# Patient Record
Sex: MALE | Race: WHITE | Employment: FULL TIME | URBAN - METROPOLITAN AREA
[De-identification: names, ages, dates, MRNs, and addresses within clinical notes are randomized per-mention and may not be internally consistent; named-entity substitution may affect disease eponyms.]

---

## 2017-01-24 ENCOUNTER — HOSPITAL ENCOUNTER (OUTPATIENT)
Dept: LAB | Age: 47
Discharge: HOME | End: 2017-01-24
Payer: OTHER GOVERNMENT

## 2017-01-24 DIAGNOSIS — E78.00 PURE HYPERCHOLESTEROLEMIA: ICD-10-CM

## 2017-01-24 LAB
ALBUMIN SERPL BCP-MCNC: 3.8 G/DL (ref 3.4–5)
ALBUMIN/GLOB SERPL: 1.3 {RATIO} (ref 0.8–1.7)
ALP SERPL-CCNC: 49 U/L (ref 45–117)
ALT SERPL-CCNC: 40 U/L (ref 16–61)
ANION GAP BLD CALC-SCNC: 6 MMOL/L (ref 3–18)
APPEARANCE UR: CLEAR
AST SERPL W P-5'-P-CCNC: 15 U/L (ref 15–37)
BASOPHILS # BLD AUTO: 0 K/UL (ref 0–0.06)
BASOPHILS # BLD: 0 % (ref 0–2)
BILIRUB SERPL-MCNC: 0.5 MG/DL (ref 0.2–1)
BILIRUB UR QL: NEGATIVE
BUN SERPL-MCNC: 12 MG/DL (ref 7–18)
BUN/CREAT SERPL: 10 (ref 12–20)
CALCIUM SERPL-MCNC: 8.7 MG/DL (ref 8.5–10.1)
CHLORIDE SERPL-SCNC: 108 MMOL/L (ref 100–108)
CHOLEST SERPL-MCNC: 166 MG/DL
CO2 SERPL-SCNC: 28 MMOL/L (ref 21–32)
COLOR UR: YELLOW
CREAT SERPL-MCNC: 1.26 MG/DL (ref 0.6–1.3)
DIFFERENTIAL METHOD BLD: ABNORMAL
EOSINOPHIL # BLD: 0.1 K/UL (ref 0–0.4)
EOSINOPHIL NFR BLD: 2 % (ref 0–5)
ERYTHROCYTE [DISTWIDTH] IN BLOOD BY AUTOMATED COUNT: 13.3 % (ref 11.6–14.5)
GLOBULIN SER CALC-MCNC: 2.9 G/DL (ref 2–4)
GLUCOSE SERPL-MCNC: 102 MG/DL (ref 74–99)
GLUCOSE UR STRIP.AUTO-MCNC: NEGATIVE MG/DL
HCT VFR BLD AUTO: 47.7 % (ref 36–48)
HDLC SERPL-MCNC: 42 MG/DL (ref 40–60)
HDLC SERPL: 4 {RATIO} (ref 0–5)
HGB BLD-MCNC: 16.1 G/DL (ref 13–16)
HGB UR QL STRIP: NEGATIVE
KETONES UR QL STRIP.AUTO: NEGATIVE MG/DL
LDLC SERPL CALC-MCNC: 99.6 MG/DL (ref 0–100)
LEUKOCYTE ESTERASE UR QL STRIP.AUTO: NEGATIVE
LIPID PROFILE,FLP: NORMAL
LYMPHOCYTES # BLD AUTO: 37 % (ref 21–52)
LYMPHOCYTES # BLD: 1.9 K/UL (ref 0.9–3.6)
MCH RBC QN AUTO: 29.4 PG (ref 24–34)
MCHC RBC AUTO-ENTMCNC: 33.8 G/DL (ref 31–37)
MCV RBC AUTO: 87 FL (ref 74–97)
MONOCYTES # BLD: 0.3 K/UL (ref 0.05–1.2)
MONOCYTES NFR BLD AUTO: 6 % (ref 3–10)
NEUTS SEG # BLD: 2.8 K/UL (ref 1.8–8)
NEUTS SEG NFR BLD AUTO: 55 % (ref 40–73)
NITRITE UR QL STRIP.AUTO: NEGATIVE
PH UR STRIP: 5 [PH] (ref 5–8)
PLATELET # BLD AUTO: 197 K/UL (ref 135–420)
PMV BLD AUTO: 10.1 FL (ref 9.2–11.8)
POTASSIUM SERPL-SCNC: 3.9 MMOL/L (ref 3.5–5.5)
PROT SERPL-MCNC: 6.7 G/DL (ref 6.4–8.2)
PROT UR STRIP-MCNC: NEGATIVE MG/DL
RBC # BLD AUTO: 5.48 M/UL (ref 4.7–5.5)
SODIUM SERPL-SCNC: 142 MMOL/L (ref 136–145)
SP GR UR REFRACTOMETRY: >1.03 (ref 1–1.03)
TRIGL SERPL-MCNC: 122 MG/DL (ref ?–150)
TSH SERPL DL<=0.05 MIU/L-ACNC: 2.45 UIU/ML (ref 0.36–3.74)
UROBILINOGEN UR QL STRIP.AUTO: 1 EU/DL (ref 0.2–1)
VLDLC SERPL CALC-MCNC: 24.4 MG/DL
WBC # BLD AUTO: 5.1 K/UL (ref 4.6–13.2)

## 2017-01-24 PROCEDURE — 85025 COMPLETE CBC W/AUTO DIFF WBC: CPT | Performed by: FAMILY MEDICINE

## 2017-01-24 PROCEDURE — 36415 COLL VENOUS BLD VENIPUNCTURE: CPT | Performed by: FAMILY MEDICINE

## 2017-01-24 PROCEDURE — 84443 ASSAY THYROID STIM HORMONE: CPT | Performed by: FAMILY MEDICINE

## 2017-01-24 PROCEDURE — 80053 COMPREHEN METABOLIC PANEL: CPT | Performed by: FAMILY MEDICINE

## 2017-01-24 PROCEDURE — 81003 URINALYSIS AUTO W/O SCOPE: CPT | Performed by: FAMILY MEDICINE

## 2017-01-24 PROCEDURE — 80061 LIPID PANEL: CPT | Performed by: FAMILY MEDICINE

## 2017-01-30 ENCOUNTER — OFFICE VISIT (OUTPATIENT)
Dept: FAMILY MEDICINE CLINIC | Age: 47
End: 2017-01-30

## 2017-01-30 VITALS
RESPIRATION RATE: 14 BRPM | DIASTOLIC BLOOD PRESSURE: 70 MMHG | HEART RATE: 75 BPM | HEIGHT: 70 IN | BODY MASS INDEX: 29.92 KG/M2 | TEMPERATURE: 95.8 F | OXYGEN SATURATION: 95 % | SYSTOLIC BLOOD PRESSURE: 99 MMHG | WEIGHT: 209 LBS

## 2017-01-30 DIAGNOSIS — B36.0 TINEA VERSICOLOR: ICD-10-CM

## 2017-01-30 DIAGNOSIS — Z23 ENCOUNTER FOR IMMUNIZATION: ICD-10-CM

## 2017-01-30 DIAGNOSIS — N52.1 ERECTILE DYSFUNCTION DUE TO DISEASES CLASSIFIED ELSEWHERE: ICD-10-CM

## 2017-01-30 DIAGNOSIS — E78.00 PURE HYPERCHOLESTEROLEMIA: ICD-10-CM

## 2017-01-30 DIAGNOSIS — Z00.00 ANNUAL PHYSICAL EXAM: Primary | ICD-10-CM

## 2017-01-30 RX ORDER — SELENIUM SULFIDE 23 MG/ML
SHAMPOO TOPICAL
Qty: 180 ML | Refills: 12 | Status: SHIPPED | OUTPATIENT
Start: 2017-01-30

## 2017-01-30 RX ORDER — SIMVASTATIN 20 MG/1
20 TABLET, FILM COATED ORAL
Qty: 90 TAB | Refills: 4 | Status: SHIPPED | OUTPATIENT
Start: 2017-01-30

## 2017-01-30 RX ORDER — SILDENAFIL 50 MG/1
50 TABLET, FILM COATED ORAL AS NEEDED
Qty: 10 TAB | Refills: 12 | Status: SHIPPED | OUTPATIENT
Start: 2017-01-30 | End: 2017-01-30 | Stop reason: SDUPTHER

## 2017-01-30 RX ORDER — SILDENAFIL 50 MG/1
50 TABLET, FILM COATED ORAL AS NEEDED
Qty: 10 TAB | Refills: 12 | Status: SHIPPED | OUTPATIENT
Start: 2017-01-30

## 2017-01-30 NOTE — PROGRESS NOTES
1. Have you been to the ER, urgent care clinic since your last visit? Hospitalized since your last visit? No    2. Have you seen or consulted any other health care providers outside of the [address] [place] [address] since your last visit? Include any pap smears or colon screening.  No

## 2017-01-30 NOTE — MR AVS SNAPSHOT
Visit Information Date & Time Provider Department Dept. Phone Encounter #  
 1/30/2017  7:30 AM Mica Mosley, [facility] [phone] [id number] Follow-up Instructions Return in about 1 year (around 1/30/2018) for physical, labs prior, EKG next visit. Upcoming Health Maintenance Date Due INFLUENZA AGE 9 TO ADULT 8/1/2016 DTaP/Tdap/Td series (2 - Td) 10/27/2016 Allergies as of 1/30/2017  Review Complete On: 1/30/2017 By: Mica Mosley, DO No Known Allergies Current Immunizations  Reviewed on 2/17/2016 Name Date Influenza Vaccine 1/30/2017, 2/17/2016, 10/30/2013 12:46 PM  
 Influenza Vaccine PF 9/24/2014  4:01 PM  
 Influenza Vaccine Split 11/20/2011 TDAP Vaccine 10/27/2006 Tdap 1/30/2017 Not reviewed this visit You Were Diagnosed With   
  
 Codes Comments Annual physical exam    -  Primary ICD-10-CM: Z00.00 ICD-9-CM: V70.0 Pure hypercholesterolemia     ICD-10-CM: E78.00 ICD-9-CM: 272.0 Tinea versicolor     ICD-10-CM: B36.0 ICD-9-CM: 111.0 Encounter for immunization     ICD-10-CM: G45 ICD-9-CM: V03.89 Erectile dysfunction due to diseases classified elsewhere     ICD-10-CM: N52.1 ICD-9-CM: 607.84 Vitals BP Pulse Temp Resp Height(growth percentile) Weight(growth percentile) 99/70 (BP 1 Location: Left arm, BP Patient Position: Sitting) 75 95.8 °F (35.4 °C) (Oral) 14 5' 10\" (1.778 m) 209 lb (94.8 kg) SpO2 BMI Smoking Status 95% 29.99 kg/m2 Never Smoker BMI and BSA Data Body Mass Index Body Surface Area  
 29.99 kg/m 2 2.16 m 2 Preferred Pharmacy Pharmacy Name Phone Fermin Rocha [facility] [phone] Your Updated Medication List  
  
   
This list is accurate as of: 1/30/17  8:17 AM.  Always use your most recent med list.  
  
  
  
  
 selenium sulfide 2.3 % Sham  
 Use to wash body twice a week  
  
 sildenafil citrate 50 mg tablet Commonly known as:  VIAGRA Take 1 Tab by mouth as needed. Indications: Erectile Dysfunction  
  
 simvastatin 20 mg tablet Commonly known as:  ZOCOR Take 1 Tab by mouth nightly. Prescriptions Sent to Pharmacy Refills  
 simvastatin (ZOCOR) 20 mg tablet 4 Sig: Take 1 Tab by mouth nightly. Class: Normal  
 Pharmacy: [address], [address] Ph #: [phone] Route: Oral  
 selenium sulfide 2.3 % sham 12 Sig: Use to wash body twice a week Class: Normal  
 Pharmacy: [address], [address] Ph #: [phone]  
 sildenafil citrate (VIAGRA) 50 mg tablet 12 Sig: Take 1 Tab by mouth as needed. Indications: Erectile Dysfunction Class: Normal  
 Pharmacy: [address], [address] Ph #: [phone] Route: Oral  
  
We Performed the Following AMB POC EKG ROUTINE W/ 12 LEADS, INTER & REP [73922 CPT(R)] Comments:  
 Verbal order with read back per Dr. Palma Banda request  
 TETANUS, DIPHTHERIA TOXOIDS AND ACELLULAR PERTUSSIS VACCINE (TDAP), IN INDIVIDS. >=7, IM [id number] CPT(R)] Follow-up Instructions Return in about 1 year (around 1/30/2018) for physical, labs prior, EKG next visit. To-Do List   
 Around 01/30/2018 Lab:  CBC WITH AUTOMATED DIFF Around 01/30/2018 Lab:  LIPID PANEL Around 01/30/2018 Lab:  METABOLIC PANEL, COMPREHENSIVE Around 01/30/2018 Lab:  TSH 3RD GENERATION Around 01/30/2018 Lab:  URINALYSIS W/ RFLX MICROSCOPIC Patient Instructions Erectile Dysfunction: Care Instructions Your Care Instructions A man has erectile dysfunction (ED) when he routinely can't get or keep an erection that allows satisfactory sex.  He may not be able to have an erection at any time. Or he may not be able to have one that is firm enough or lasts long enough to complete intercourse. ED is not the same as having trouble getting an erection now and then. That's common. It happens to most men at some time. ED can be caused by problems with the blood vessels, nerves, or hormones. It can be caused by diabetes, heart disease, and injuries. Nerve disorders, such as multiple sclerosis or Parkinson's disease, can also cause it. ED can also be caused by medicines, alcohol, and tobacco. Or it may be caused by depression, stress, grief, or relationship problems. Follow-up care is a key part of your treatment and safety. Be sure to make and go to all appointments, and call your doctor if you are having problems. It's also a good idea to know your test results and keep a list of the medicines you take. How can you care for yourself at home? Lifestyle · Limit alcohol. Have no more than 2 drinks a day. · Do not smoke. Smoking makes it harder for the blood vessels in the penis to relax and let blood flow in. If you need help quitting, talk to your doctor about stop-smoking programs and medicines. These can increase your chances of quitting for good. · Do not use cocaine, heroin, or other illegal drugs. · Try to reduce stress. · Give yourself time to adjust to change. Changes in your job, family, relationships, home life, and other areas can cause stress. And stress can cause erection problems. Work with your partner · Don't assume that you know what your partner likes when it comes to sex. You may be wrong. Talk about what each of you does and does not enjoy. · Make time outside of the bedroom to talk about your sex life. If you avoid sex because you are afraid of having erection problems, your partner may worry that you are no longer interested.  
· If you and your partner have trouble talking about sex, see a therapist who can help you talk about it. Reading books with your partner about sexual health may also help. · Relax. Take time for more foreplay. Worrying about your erections may only make things worse. Medicines · Tell your doctor about all the medicines that you take. ¨ Some medicines can cause erection problems. ¨ Some medicines can have dangerous interactions with medicines that are prescribed for ED, including over-the-counter medicines and herbal products. · Be safe with medicines. Take your medicines exactly as prescribed. Call your doctor if you think you are having a problem with your medicine. · Talk to your doctor about trying a medicine to help you keep an erection. This could be a medicine such as Viagra, Levitra, or Cialis. If you have a heart problem, ask your doctor if these are safe for you. Do not take these medicines if you take nitroglycerin or other nitrate medicine. When should you call for help? Call your doctor now or seek immediate medical care if: 
· You have an erection that lasts longer than 3 hours. · You took an erection-enhancing medicine (such as Cialis, Levitra, or Viagra) in the past 24 hours, and you have angina symptoms, such as chest pain or pressure. Do not take nitroglycerin. · You have erection problems along with pain or difficulty with urination, fever, or pain in the lower belly. Watch closely for changes in your health, and be sure to contact your doctor if you have any problems. Where can you learn more? Go to http://jonas-chao.info/. Enter [phone] in the search box to learn more about \"Erectile Dysfunction: Care Instructions. \" Current as of: May 24, 2016 Content Version: 11.1 © 1088-2366 [employer]. Care instructions adapted under license by [employer] (which disclaims liability or warranty for this information).  If you have questions about a medical condition or this instruction, always ask your healthcare professional. [address] any warranty or liability for your use of this information. Introducing [facility]! Dear Keerthi Bateman: 
Thank you for requesting a [employer] account. Our records indicate that you already have an active [employer] account. You can access your account anytime at [email or web address]. [email or web address] Did you know that you can access your hospital and ER discharge instructions at any time in [employer]? You can also review all of your test results from your hospital stay or ER visit. Additional Information If you have questions, please visit the Frequently Asked Questions section of the [employer] website at [email or web address]/. Remember, [employer] is NOT to be used for urgent needs. For medical emergencies, dial 911. Now available from your iPhone and Android! Please provide this summary of care documentation to your next provider. Your primary care clinician is listed as [id number] [facility]. If you have any questions after today's visit, please call [phone].

## 2017-01-30 NOTE — PATIENT INSTRUCTIONS
Erectile Dysfunction: Care Instructions  Your Care Instructions  A man has erectile dysfunction (ED) when he routinely can't get or keep an erection that allows satisfactory sex. He may not be able to have an erection at any time. Or he may not be able to have one that is firm enough or lasts long enough to complete intercourse. ED is not the same as having trouble getting an erection now and then. That's common. It happens to most men at some time. ED can be caused by problems with the blood vessels, nerves, or hormones. It can be caused by diabetes, heart disease, and injuries. Nerve disorders, such as multiple sclerosis or Parkinson's disease, can also cause it. ED can also be caused by medicines, alcohol, and tobacco. Or it may be caused by depression, stress, grief, or relationship problems. Follow-up care is a key part of your treatment and safety. Be sure to make and go to all appointments, and call your doctor if you are having problems. It's also a good idea to know your test results and keep a list of the medicines you take. How can you care for yourself at home? Lifestyle  · Limit alcohol. Have no more than 2 drinks a day. · Do not smoke. Smoking makes it harder for the blood vessels in the penis to relax and let blood flow in. If you need help quitting, talk to your doctor about stop-smoking programs and medicines. These can increase your chances of quitting for good. · Do not use cocaine, heroin, or other illegal drugs. · Try to reduce stress. · Give yourself time to adjust to change. Changes in your job, family, relationships, home life, and other areas can cause stress. And stress can cause erection problems. Work with your partner  · Don't assume that you know what your partner likes when it comes to sex. You may be wrong. Talk about what each of you does and does not enjoy. · Make time outside of the bedroom to talk about your sex life.  If you avoid sex because you are afraid of having erection problems, your partner may worry that you are no longer interested. · If you and your partner have trouble talking about sex, see a therapist who can help you talk about it. Reading books with your partner about sexual health may also help. · Relax. Take time for more foreplay. Worrying about your erections may only make things worse. Medicines  · Tell your doctor about all the medicines that you take. ¨ Some medicines can cause erection problems. ¨ Some medicines can have dangerous interactions with medicines that are prescribed for ED, including over-the-counter medicines and herbal products. · Be safe with medicines. Take your medicines exactly as prescribed. Call your doctor if you think you are having a problem with your medicine. · Talk to your doctor about trying a medicine to help you keep an erection. This could be a medicine such as Viagra, Levitra, or Cialis. If you have a heart problem, ask your doctor if these are safe for you. Do not take these medicines if you take nitroglycerin or other nitrate medicine. When should you call for help? Call your doctor now or seek immediate medical care if:  · You have an erection that lasts longer than 3 hours. · You took an erection-enhancing medicine (such as Cialis, Levitra, or Viagra) in the past 24 hours, and you have angina symptoms, such as chest pain or pressure. Do not take nitroglycerin. · You have erection problems along with pain or difficulty with urination, fever, or pain in the lower belly. Watch closely for changes in your health, and be sure to contact your doctor if you have any problems. Where can you learn more? Go to http://jonas-chao.info/. Enter [phone] in the search box to learn more about \"Erectile Dysfunction: Care Instructions. \"  Current as of: May 24, 2016  Content Version: 11.1  © 3960-7137 [employer].  Care instructions adapted under license by [employer] (which disclaims liability or warranty for this information). If you have questions about a medical condition or this instruction, always ask your healthcare professional. [address] any warranty or liability for your use of this information.

## 2017-01-30 NOTE — PROGRESS NOTES
Jenny Blake is a 51 y.o.  male and presents for a preventive health care visit        Subjective:  Health Maintenance History  Immunizations reviewed, dtap indicated. colonoscopy:na, Eye exam: utd , Chest CT: na ,      Patient Active Problem List    Diagnosis Date Noted    Pure hypercholesterolemia 08/20/2012     Current Outpatient Prescriptions   Medication Sig Dispense Refill    simvastatin (ZOCOR) 20 mg tablet Take 1 Tab by mouth nightly. 90 Tab 4    selenium sulfide 2.3 % sham Use to wash body twice a week 180 mL 12     No Known Allergies  Past Medical History   Diagnosis Date    Hypercholesterolemia     Ill-defined condition      high cholesterol    Pure hypercholesterolemia 8/20/2012     Past Surgical History   Procedure Laterality Date    Hx appendectomy  2001    Hx heent  2004     [place] [id number]     History reviewed. No pertinent family history.   Social History   Substance Use Topics    Smoking status: Never Smoker    Smokeless tobacco: Never Used    Alcohol use Yes           ROS       General: negative for - chills, fatigue, fever, weight change  Psych: negative for - anxiety, depression, irritability or mood swings  ENT: negative for - headaches, hearing change, nasal congestion, oral lesions, sneezing or sore throat  Heme/ Lymph: negative for - bleeding problems, bruising, pallor or swollen lymph nodes  Endo: negative for - hot flashes, polydipsia/polyuria or temperature intolerance  Resp: negative for - cough, shortness of breath or wheezing  CV: negative for - chest pain, edema or palpitations  GI: negative for - abdominal pain, change in bowel habits, constipation, diarrhea or nausea/vomiting  : negative for - dysuria, hematuria, incontinence, pelvic pain or vulvar/vaginal symptoms  MSK: negative for - joint pain, joint swelling or muscle pain  Neuro: negative for - confusion, headaches, seizures or weakness  Derm: negative for - dry skin, hair changes, rash or skin lesion changes        Objective:  Vitals:    01/30/17 0726   BP: 99/70   Pulse: 75   Resp: 14   Temp: 95.8 °F (35.4 °C)   TempSrc: Oral   SpO2: 95%   Weight: 209 lb (94.8 kg)   Height: 5' 10\" (1.778 m)   PainSc:   0 - No pain     alert, well appearing, and in no distress, oriented to person, place, and time and normal appearing weight  General appearance - alert, well appearing, and in no distress, oriented to person, place, and time and normal appearing weight   Visit Vitals    BP 99/70 (BP 1 Location: Left arm, BP Patient Position: Sitting)    Pulse 75    Temp 95.8 °F (35.4 °C) (Oral)    Resp 14    Ht 5' 10\" (1.778 m)    Wt 209 lb (94.8 kg)    SpO2 95%    BMI 29.99 kg/m2       General appearance  alert, cooperative, no distress, appears stated age   Head  Normocephalic, without obvious abnormality, atraumatic   Eyes  conjunctivae/corneas clear. PERRL, EOM's intact. Fundi benign   Ears  normal TM's and external ear canals AU   Nose Nares normal. Septum midline. Mucosa normal. No drainage or sinus tenderness. Throat Lips, mucosa, and tongue normal. Teeth and gums normal   Neck supple, symmetrical, trachea midline, no adenopathy, thyroid: not enlarged, symmetric, no tenderness/mass/nodules, no carotid bruit and no JVD   Back   symmetric, no curvature. ROM normal. No CVA tenderness   Lungs   clear to auscultation bilaterally   Chest wall  no tenderness   Heart  regular rate and rhythm, S1, S2 normal, no murmur, click, rub or gallop   Abdomen   soft, non-tender.  Bowel sounds normal. No masses,  No organomegaly   Genitalia  Normal male   Rectal  Normal tone, normal prostate, no masses or tenderness  Guaiac negative stool   Extremities extremities normal, atraumatic, no cyanosis or edema   Pulses 2+ and symmetric   Skin Skin color, texture, turgor normal. No rashes or lesions   Lymph nodes Cervical, supraclavicular, and axillary nodes normal.   Neurologic Normal       LABS  Component      Latest Ref Rng & Units 1/24/2017 1/24/2017 1/24/2017 1/24/2017           8:32 AM  8:32 AM  8:32 AM  8:32 AM   WBC      4.6 - 13.2 K/uL  5.1     RBC      4.70 - 5.50 M/uL  5.48     HGB      13.0 - 16.0 g/dL  16.1 (H)     HCT      36.0 - 48.0 %  47.7     MCV      74.0 - 97.0 FL  87.0     MCH      24.0 - 34.0 PG  29.4     MCHC      31.0 - 37.0 g/dL  33.8     RDW      11.6 - 14.5 %  13.3     PLATELET      884 - 818 K/uL  197     MPV      9.2 - 11.8 FL  10.1     NEUTROPHILS      40 - 73 %  55     LYMPHOCYTES      21 - 52 %  37     MONOCYTES      3 - 10 %  6     EOSINOPHILS      0 - 5 %  2     BASOPHILS      0 - 2 %  0     ABS. NEUTROPHILS      1.8 - 8.0 K/UL  2.8     ABS. LYMPHOCYTES      0.9 - 3.6 K/UL  1.9     ABS. MONOCYTES      0.05 - 1.2 K/UL  0.3     ABS. EOSINOPHILS      0.0 - 0.4 K/UL  0.1     ABS. BASOPHILS      0.0 - 0.06 K/UL  0.0     DF        AUTOMATED     Sodium      136 - 145 mmol/L    142   Potassium      3.5 - 5.5 mmol/L    3.9   Chloride      100 - 108 mmol/L    108   CO2      21 - 32 mmol/L    28   Anion gap      3.0 - 18 mmol/L    6   Glucose      74 - 99 mg/dL    102 (H)   BUN      7.0 - 18 MG/DL    12   Creatinine      0.6 - 1.3 MG/DL    1.26   BUN/Creatinine ratio      12 - 20      10 (L)   GFR est AA      >60 ml/min/1.73m2    >60   GFR est non-AA      >60 ml/min/1.73m2    >60   Calcium      8.5 - 10.1 MG/DL    8.7   Bilirubin, total      0.2 - 1.0 MG/DL    0.5   ALT      16 - 61 U/L    40   AST      15 - 37 U/L    15   Alk.  phosphatase      45 - 117 U/L    49   Protein, total      6.4 - 8.2 g/dL    6.7   Albumin      3.4 - 5.0 g/dL    3.8   Globulin      2.0 - 4.0 g/dL    2.9   A-G Ratio      0.8 - 1.7      1.3   Color       YELLOW      Appearance       CLEAR      Specific gravity      1.005 - 1.030 >1.030 (H)      pH (UA)      5.0 - 8.0   5.0      Protein      NEG mg/dL NEGATIVE      Glucose      NEG mg/dL NEGATIVE      Ketone      NEG mg/dL NEGATIVE      Bilirubin      NEG   NEGATIVE      Blood      NEG   NEGATIVE Urobilinogen      0.2 - 1.0 EU/dL 1.0      Nitrites      NEG   NEGATIVE      Leukocyte Esterase      NEG   NEGATIVE      Cholesterol, total      <200 MG/DL       Triglyceride      <150 MG/DL       HDL Cholesterol      40 - 60 MG/DL       LDL, calculated      0 - 100 MG/DL       VLDL, calculated      MG/DL       CHOL/HDL Ratio      0 - 5.0         TSH      0.36 - 3.74 uIU/mL   2.45      Component      Latest Ref Rng & Units 1/24/2017           8:32 AM   WBC      4.6 - 13.2 K/uL    RBC      4.70 - 5.50 M/uL    HGB      13.0 - 16.0 g/dL    HCT      36.0 - 48.0 %    MCV      74.0 - 97.0 FL    MCH      24.0 - 34.0 PG    MCHC      31.0 - 37.0 g/dL    RDW      11.6 - 14.5 %    PLATELET      312 - 732 K/uL    MPV      9.2 - 11.8 FL    NEUTROPHILS      40 - 73 %    LYMPHOCYTES      21 - 52 %    MONOCYTES      3 - 10 %    EOSINOPHILS      0 - 5 %    BASOPHILS      0 - 2 %    ABS. NEUTROPHILS      1.8 - 8.0 K/UL    ABS. LYMPHOCYTES      0.9 - 3.6 K/UL    ABS. MONOCYTES      0.05 - 1.2 K/UL    ABS. EOSINOPHILS      0.0 - 0.4 K/UL    ABS. BASOPHILS      0.0 - 0.06 K/UL    DF          Sodium      136 - 145 mmol/L    Potassium      3.5 - 5.5 mmol/L    Chloride      100 - 108 mmol/L    CO2      21 - 32 mmol/L    Anion gap      3.0 - 18 mmol/L    Glucose      74 - 99 mg/dL    BUN      7.0 - 18 MG/DL    Creatinine      0.6 - 1.3 MG/DL    BUN/Creatinine ratio      12 - 20      GFR est AA      >60 ml/min/1.73m2    GFR est non-AA      >60 ml/min/1.73m2    Calcium      8.5 - 10.1 MG/DL    Bilirubin, total      0.2 - 1.0 MG/DL    ALT      16 - 61 U/L    AST      15 - 37 U/L    Alk.  phosphatase      45 - 117 U/L    Protein, total      6.4 - 8.2 g/dL    Albumin      3.4 - 5.0 g/dL    Globulin      2.0 - 4.0 g/dL    A-G Ratio      0.8 - 1.7      Color          Appearance          Specific gravity      1.005 - 1.030    pH (UA)      5.0 - 8.0      Protein      NEG mg/dL    Glucose      NEG mg/dL    Ketone      NEG mg/dL    Bilirubin      NEG Blood      NEG      Urobilinogen      0.2 - 1.0 EU/dL    Nitrites      NEG      Leukocyte Esterase      NEG      Cholesterol, total      <200 MG/   Triglyceride      <150 MG/   HDL Cholesterol      40 - 60 MG/DL 42   LDL, calculated      0 - 100 MG/DL 99.6   VLDL, calculated      MG/DL 24.4   CHOL/HDL Ratio      0 - 5.0   4.0   TSH      0.36 - 3.74 uIU/mL      TESTS  EKG  NSR    Assessment/Plan:  Healthy patient except as noted below:    Health Maintenance up to date. Recommend f/u physical 1 year. Routine screening labs/tests recommended prior to next physical.      Lab review: labs are reviewed, up to date and normal, orders written for new lab studies as appropriate; see orders        I have discussed the diagnosis with the patient and the intended plan as seen in the above orders. The patient has received an after-visit summary and questions were answered concerning future plans. I have discussed medication side effects and warnings with the patient as well. I have reviewed the plan of care with the patient, accepted their input and they are in agreement with the treatment goals. Follow-up Disposition: Not on File    -------------------------------------------------------------------------------------------------------------------    Problem Assessment  and also with   Chief Complaint   Patient presents with    Cholesterol Problem    Erectile Dysfunction    Rash         HPI ;  Cardiovascular Review:  The patient has hyperlipidemia. Diet and Lifestyle: not attempting to follow a low fat, low cholesterol diet, not attempting to follow a low sodium diet  Home BP Monitoring: is not measured at home. Pertinent ROS: taking medications as instructed, no medication side effects noted, no TIA's, no chest pain on exertion, no dyspnea on exertion, no swelling of ankles.    Additional Concerns: tinea versicolor in remission at present  Erectile dysfunction -- new problem Assessment/Plan:      Hyperlipidemia - well controlled  Tinea versicolor in remission  Erectile dysfunction try viatra and f/u prn          Lab review: labs are reviewed, up to date and normal

## 2018-02-20 ENCOUNTER — OFFICE VISIT (OUTPATIENT)
Dept: FAMILY MEDICINE CLINIC | Age: 48
End: 2018-02-20

## 2018-02-20 VITALS
TEMPERATURE: 97.3 F | BODY MASS INDEX: 30.06 KG/M2 | DIASTOLIC BLOOD PRESSURE: 82 MMHG | RESPIRATION RATE: 16 BRPM | OXYGEN SATURATION: 96 % | WEIGHT: 210 LBS | HEIGHT: 70 IN | HEART RATE: 109 BPM | SYSTOLIC BLOOD PRESSURE: 131 MMHG

## 2018-02-20 DIAGNOSIS — J02.9 SORE THROAT: Primary | ICD-10-CM

## 2018-02-20 LAB
S PYO AG THROAT QL: NEGATIVE
VALID INTERNAL CONTROL?: YES

## 2018-02-20 RX ORDER — PROMETHAZINE HYDROCHLORIDE AND CODEINE PHOSPHATE 6.25; 1 MG/5ML; MG/5ML
5 SOLUTION ORAL
Qty: 120 ML | Refills: 1 | Status: SHIPPED | OUTPATIENT
Start: 2018-02-20

## 2018-02-20 RX ORDER — OSELTAMIVIR PHOSPHATE 75 MG/1
75 CAPSULE ORAL 2 TIMES DAILY
Qty: 10 CAP | Refills: 0 | Status: SHIPPED | OUTPATIENT
Start: 2018-02-20 | End: 2018-02-25

## 2018-02-20 NOTE — LETTER
NOTIFICATION RETURN TO WORK / SCHOOL 
 
2/20/2018 8:33 AM 
 
Mr. Barbra Lala 
[address] [place] [id number] To Whom It May Concern: 
 
[facility] He will return to work/school on: 02/23/2018 If there are questions or concerns please have the patient contact our office.  
 
 
 
Sincerely, 
 
 
Yosef Galo., DO

## 2018-02-20 NOTE — PROGRESS NOTES
Nikki Armstrong is a 49 y.o. male presents today for sore throat. Pt is in Room # 4        1. Have you been to the ER, urgent care clinic since your last visit? Hospitalized since your last visit? No    2. Have you seen or consulted any other health care providers outside of the [address] [place] [address] since your last visit? Include any pap smears or colon screening.  No

## 2018-02-20 NOTE — PROGRESS NOTES
Liudmila Singh is a 49 y.o.  male and presents with    Chief Complaint   Patient presents with    Cold     Onset 1 day ago of fever, headache, sore throat, congestion;      Subjective: Additional Concerns:          Patient Active Problem List    Diagnosis Date Noted    Pure hypercholesterolemia 08/20/2012     Current Outpatient Prescriptions   Medication Sig Dispense Refill    promethazine-codeine (PHENERGAN WITH CODEINE) 6.25-10 mg/5 mL syrup Take 5 mL by mouth four (4) times daily as needed for Cough. Max Daily Amount: 20 mL. 120 mL 1    oseltamivir (TAMIFLU) 75 mg capsule Take 1 Cap by mouth two (2) times a day for 5 days. 10 Cap 0    simvastatin (ZOCOR) 20 mg tablet Take 1 Tab by mouth nightly. 90 Tab 4    selenium sulfide 2.3 % sham Use to wash body twice a week 180 mL 12    sildenafil citrate (VIAGRA) 50 mg tablet Take 1 Tab by mouth as needed. Indications: Erectile Dysfunction 10 Tab 12     No Known Allergies  Past Medical History:   Diagnosis Date    Hypercholesterolemia     Ill-defined condition     high cholesterol    Pure hypercholesterolemia 8/20/2012     Past Surgical History:   Procedure Laterality Date    HX APPENDECTOMY  2001    HX HEENT  2004    [facility]     No family history on file. Social History   Substance Use Topics    Smoking status: Never Smoker    Smokeless tobacco: Never Used    Alcohol use Yes       ROS       All other systems reviewed and are negative.       Objective:  Vitals:    02/20/18 0825   BP: 131/82   Pulse: (!) 109   Resp: 16   Temp: 97.3 °F (36.3 °C)   TempSrc: Oral   SpO2: 96%   Weight: 210 lb (95.3 kg)   Height: 5' 10\" (1.778 m)   PainSc:   4   PainLoc: Throat                 alert, well appearing, and in no distress and oriented to person, place, and time  Mouth - mucous membranes moist, pharynx normal without lesions  Neck - supple, no significant adenopathy  Lymphatics - no palpable lymphadenopathy, no hepatosplenomegaly  Chest - clear to auscultation, no wheezes, rales or rhonchi, symmetric air entry  Heart - normal rate, regular rhythm, normal S1, S2, no murmurs, rubs, clicks or gallops  Abdomen - soft, nontender, nondistended, no masses or organomegaly        LABS   Strep neg  TESTS      Assessment/Plan:    Dioni Chavez possible flu    Lab review: labs are reviewed, up to date and normal    Diagnoses and all orders for this visit:    1. Sore throat  -     AMB POC RAPID STREP A  -     promethazine-codeine (PHENERGAN WITH CODEINE) 6.25-10 mg/5 mL syrup; Take 5 mL by mouth four (4) times daily as needed for Cough. Max Daily Amount: 20 mL. -     oseltamivir (TAMIFLU) 75 mg capsule; Take 1 Cap by mouth two (2) times a day for 5 days. I have discussed the diagnosis with the patient and the intended plan as seen in the above orders. The patient has received an after-visit summary and questions were answered concerning future plans. I have discussed medication side effects and warnings with the patient as well. I have reviewed the plan of care with the patient, accepted their input and they are in agreement with the treatment goals. Follow-up Disposition:  Return if symptoms worsen or fail to improve.

## 2018-02-20 NOTE — MR AVS SNAPSHOT
[address] [facility] [address] [address] 
[phone] Patient: Kang Huerta MRN: [id number] :1970 Visit Information Date & Time Provider Department Dept. Phone Encounter #  
 2018  8:00 AM [facility] [phone] [id number] Follow-up Instructions Return if symptoms worsen or fail to improve. Upcoming Health Maintenance Date Due Influenza Age 5 to Adult 2017 DTaP/Tdap/Td series (3 - Td) 2027 Allergies as of 2018  Review Complete On: 2017 By: Flo Craft,  No Known Allergies Current Immunizations  Reviewed on 2016 Name Date Influenza Vaccine 2017, 2016, 10/30/2013 12:46 PM  
 Influenza Vaccine PF 2014  4:01 PM  
 Influenza Vaccine Split 2011 TDAP Vaccine 10/27/2006 Tdap 2017 Not reviewed this visit You Were Diagnosed With   
  
 Codes Comments Sore throat    -  Primary ICD-10-CM: J02.9 ICD-9-CM: 738 Vitals BP Pulse Temp Resp Height(growth percentile) Weight(growth percentile) 131/82 (BP 1 Location: Right arm, BP Patient Position: Sitting) (!) 109 97.3 °F (36.3 °C) (Oral) 16 5' 10\" (1.778 m) 210 lb (95.3 kg) SpO2 BMI Smoking Status 96% 30.13 kg/m2 Never Smoker BMI and BSA Data Body Mass Index Body Surface Area  
 30.13 kg/m 2 2.17 m 2 Preferred Pharmacy Pharmacy Name Phone [address] R[address] [facility] [phone] Your Updated Medication List  
  
   
This list is accurate as of: 18  8:34 AM.  Always use your most recent med list.  
  
  
  
  
 oseltamivir 75 mg capsule Commonly known as:  TAMIFLU Take 1 Cap by mouth two (2) times a day for 5 days. promethazine-codeine 6.25-10 mg/5 mL syrup Commonly known as:  PHENERGAN with CODEINE  
 Take 5 mL by mouth four (4) times daily as needed for Cough. Max Daily Amount: 20 mL. selenium sulfide 2.3 % Sham  
Use to wash body twice a week  
  
 sildenafil citrate 50 mg tablet Commonly known as:  VIAGRA Take 1 Tab by mouth as needed. Indications: Erectile Dysfunction  
  
 simvastatin 20 mg tablet Commonly known as:  ZOCOR Take 1 Tab by mouth nightly. Prescriptions Printed Refills  
 promethazine-codeine (PHENERGAN WITH CODEINE) 6.25-10 mg/5 mL syrup 1 Sig: Take 5 mL by mouth four (4) times daily as needed for Cough. Max Daily Amount: 20 mL. Class: Print Route: Oral  
 oseltamivir (TAMIFLU) 75 mg capsule 0 Sig: Take 1 Cap by mouth two (2) times a day for 5 days. Class: Print Route: Oral  
  
We Performed the Following AMB POC RAPID STREP A [25473 CPT(R)] Follow-up Instructions Return if symptoms worsen or fail to improve. Introducing [facility] & [facility] SERVICES! Dear Adal Lebron: 
Thank you for requesting a Ather[employer] account. Our records indicate that you already have an active Ather[employer] account. You can access your account anytime at [email or web address] Did you know that you can access your hospital and ER discharge instructions at any time in [employer]? You can also review all of your test results from your hospital stay or ER visit. Additional Information If you have questions, please visit the Frequently Asked Questions section of the Ather[employer] website at [email or web address] Remember, Ather[employer] is NOT to be used for urgent needs. For medical emergencies, dial 911. Now available from your iPhone and Android! Please provide this summary of care documentation to your next provider. Your primary care clinician is listed as [id number]. If you have any questions after today's visit, please call [phone].

## 2018-10-12 ENCOUNTER — OFFICE VISIT (OUTPATIENT)
Dept: INTERNAL MEDICINE CLINIC | Age: 48
End: 2018-10-12

## 2018-10-12 VITALS
DIASTOLIC BLOOD PRESSURE: 80 MMHG | OXYGEN SATURATION: 97 % | SYSTOLIC BLOOD PRESSURE: 133 MMHG | TEMPERATURE: 98 F | RESPIRATION RATE: 18 BRPM | BODY MASS INDEX: 28.63 KG/M2 | WEIGHT: 200 LBS | HEIGHT: 70 IN | HEART RATE: 88 BPM

## 2018-10-12 DIAGNOSIS — R09.81 SINUS CONGESTION: ICD-10-CM

## 2018-10-12 DIAGNOSIS — J02.9 SORE THROAT: Primary | ICD-10-CM

## 2018-10-12 RX ORDER — FLUTICASONE PROPIONATE 50 MCG
SPRAY, SUSPENSION (ML) NASAL
Qty: 1 BOTTLE | Refills: 0 | Status: SHIPPED | OUTPATIENT
Start: 2018-10-12

## 2018-10-12 NOTE — PROGRESS NOTES
HISTORY OF PRESENT ILLNESS  Viktor Mohan is a 51 y.o. male. Sore Throat    The history is provided by the patient. This is a new problem. The current episode started more than 2 days ago. The problem has not changed since onset. There has been no fever. Associated symptoms include congestion and cough. Pertinent negatives include no vomiting, no ear discharge, no ear pain, no headaches, no plugged ear sensation, no shortness of breath, no stridor, no swollen glands, no trouble swallowing and no stiff neck. He has had no exposure to strep or mono. He has tried nothing for the symptoms. The treatment provided no relief. Review of Systems   Constitutional: Negative for chills, fever and malaise/fatigue. HENT: Positive for congestion and sore throat. Negative for ear discharge, ear pain and trouble swallowing. Respiratory: Positive for cough. Negative for sputum production, shortness of breath and stridor. Gastrointestinal: Negative for abdominal pain, heartburn, nausea and vomiting. Musculoskeletal: Negative for myalgias. Neurological: Negative for dizziness and headaches. Psychiatric/Behavioral: The patient is not nervous/anxious. Physical Exam   Constitutional: He is oriented to person, place, and time. He appears well-developed and well-nourished. No distress. HENT:   Head: Normocephalic and atraumatic. Right Ear: External ear normal.   Left Ear: External ear normal.   Mouth/Throat: Oropharynx is clear and moist. No oropharyngeal exudate. Eyes: EOM are normal. Pupils are equal, round, and reactive to light. Neck: Neck supple. Cardiovascular: Regular rhythm. Pulmonary/Chest: Effort normal and breath sounds normal. No respiratory distress. He has no wheezes. Neurological: He is alert and oriented to person, place, and time. No cranial nerve deficit. Skin: Skin is dry. He is not diaphoretic. Psychiatric: He has a normal mood and affect.    Nursing note and vitals reviewed. ASSESSMENT and PLAN    ICD-10-CM ICD-9-CM    1. Sore throat J02.9 462    2. Sinus congestion R09.81 478.19 fluticasone (FLONASE) 50 mcg/actuation nasal spray   Patient advised to take medication as prescribed. Take rest and plenty of fluids. Alternate tylenol and ibuprofen for fever. Take OTC mucinex for cough and congestion. Return to clinic if condition worsens in next 72 hours.

## 2018-10-12 NOTE — MR AVS SNAPSHOT
303 [facility] 
 
 
 [address] [facility] 83 [address] 
[phone] Patient: Bridger Weems MRN: [id number] :1970 Visit Information Date & Time Provider Department Dept. Phone Encounter #  
 10/12/2018 11:30 AM Serafin Alcantara NP [facility] [phone] [id number] Upcoming Health Maintenance Date Due Influenza Age 5 to Adult 2018 DTaP/Tdap/Td series (3 - Td) 2027 Allergies as of 10/12/2018  Review Complete On: 10/12/2018 By: Kenia Edawrds No Known Allergies Current Immunizations  Reviewed on 2016 Name Date Influenza Vaccine 2017, 2016, 10/30/2013 12:46 PM  
 Influenza Vaccine PF 2014  4:01 PM  
 Influenza Vaccine Split 2011 TDAP Vaccine 10/27/2006 Tdap 2017 Not reviewed this visit You Were Diagnosed With   
  
 Codes Comments Sore throat    -  Primary ICD-10-CM: J02.9 ICD-9-CM: 421 Sinus congestion     ICD-10-CM: R09.81 ICD-9-CM: 478.19 Vitals BP Pulse Temp Resp Height(growth percentile) Weight(growth percentile) 133/80 (BP 1 Location: Right arm, BP Patient Position: Sitting) 88 98 °F (36.7 °C) (Oral) 18 5' 10\" (1.778 m) 200 lb (90.7 kg) SpO2 BMI Smoking Status 97% 28.7 kg/m2 Never Smoker Vitals History BMI and BSA Data Body Mass Index Body Surface Area 28.7 kg/m 2 2.12 m 2 Preferred Pharmacy Pharmacy Name Phone [address] [employer] [address] [phone] Your Updated Medication List  
  
   
This list is accurate as of 10/12/18 11:52 AM.  Always use your most recent med list.  
  
  
  
  
 fluticasone 50 mcg/actuation nasal spray Commonly known as:  Julieta [place] Once a day in both nostrils. For 5 days. promethazine-codeine 6.25-10 mg/5 mL syrup Commonly known as:  PHENERGAN with CODEINE  
 Take 5 mL by mouth four (4) times daily as needed for Cough. Max Daily Amount: 20 mL. selenium sulfide 2.3 % Sham  
Use to wash body twice a week  
  
 sildenafil citrate 50 mg tablet Commonly known as:  VIAGRA Take 1 Tab by mouth as needed. Indications: Erectile Dysfunction  
  
 simvastatin 20 mg tablet Commonly known as:  ZOCOR Take 1 Tab by mouth nightly. Prescriptions Printed Refills  
 fluticasone (FLONASE) 50 mcg/actuation nasal spray 0 Sig: Once a day in both nostrils. For 5 days. Class: Print Patient Instructions Sore Throat: Care Instructions Your Care Instructions Infection by bacteria or a virus causes most sore throats. Cigarette smoke, dry air, air pollution, allergies, and yelling can also cause a sore throat. Sore throats can be painful and annoying. Fortunately, most sore throats go away on their own. If you have a bacterial infection, your doctor may prescribe antibiotics. Follow-up care is a key part of your treatment and safety. Be sure to make and go to all appointments, and call your doctor if you are having problems. It's also a good idea to know your test results and keep a list of the medicines you take. How can you care for yourself at home? · If your doctor prescribed antibiotics, take them as directed. Do not stop taking them just because you feel better. You need to take the full course of antibiotics. · Gargle with warm salt water once an hour to help reduce swelling and relieve discomfort. Use 1 teaspoon of salt mixed in 1 cup of warm water. · Take an over-the-counter pain medicine, such as acetaminophen (Tylenol), ibuprofen (Advil, Motrin), or naproxen (Aleve). Read and follow all instructions on the label. · Be careful when taking over-the-counter cold or flu medicines and Tylenol at the same time. Many of these medicines have acetaminophen, which is Tylenol.  Read the labels to make sure that you are not taking more than the recommended dose. Too much acetaminophen (Tylenol) can be harmful. · Drink plenty of fluids. Fluids may help soothe an irritated throat. Hot fluids, such as tea or soup, may help decrease throat pain. · Use over-the-counter throat lozenges to soothe pain. Regular cough drops or hard candy may also help. These should not be given to young children because of the risk of choking. · Do not smoke or allow others to smoke around you. If you need help quitting, talk to your doctor about stop-smoking programs and medicines. These can increase your chances of quitting for good. · Use a vaporizer or humidifier to add moisture to your bedroom. Follow the directions for cleaning the machine. When should you call for help? Call your doctor now or seek immediate medical care if: 
  · You have new or worse trouble swallowing.  
  · Your sore throat gets much worse on one side.  
 Watch closely for changes in your health, and be sure to contact your doctor if you do not get better as expected. Where can you learn more? Go to http://jonas-chao.info/. Enter 062 [id number] 80 [id number] in the search box to learn more about \"Sore Throat: Care Instructions. \" Current as of: March 28, 2018 Content Version: 11.8 © 6158-3964 Healthwise, Incorporated. Care instructions adapted under license by [employer] (which disclaims liability or warranty for this information). If you have questions about a medical condition or this instruction, always ask your healthcare professional. [place] [address] any warranty or liability for your use of this information. Introducing [facility] & HEALTH SERVICES! Dear Angella Blanco: 
Thank you for requesting a Zefan[employer] account. Our records indicate that you already have an active Zefan[employer] account. You can access your account anytime at [email or web address] Did you know that you can access your hospital and ER discharge instructions at any time in [employer]? You can also review all of your test results from your hospital stay or ER visit. Additional Information If you have questions, please visit the Frequently Asked Questions section of the [employer] website at [email or web address] Remember, [employer] is NOT to be used for urgent needs. For medical emergencies, dial 911. Now available from your iPhone and Android! Please provide this summary of care documentation to your next provider. Your primary care clinician is listed as [id number] [facility]. If you have any questions after today's visit, please call [phone].

## 2018-10-12 NOTE — PATIENT INSTRUCTIONS
Sore Throat: Care Instructions  Your Care Instructions    Infection by bacteria or a virus causes most sore throats. Cigarette smoke, dry air, air pollution, allergies, and yelling can also cause a sore throat. Sore throats can be painful and annoying. Fortunately, most sore throats go away on their own. If you have a bacterial infection, your doctor may prescribe antibiotics. Follow-up care is a key part of your treatment and safety. Be sure to make and go to all appointments, and call your doctor if you are having problems. It's also a good idea to know your test results and keep a list of the medicines you take. How can you care for yourself at home? · If your doctor prescribed antibiotics, take them as directed. Do not stop taking them just because you feel better. You need to take the full course of antibiotics. · Gargle with warm salt water once an hour to help reduce swelling and relieve discomfort. Use 1 teaspoon of salt mixed in 1 cup of warm water. · Take an over-the-counter pain medicine, such as acetaminophen (Tylenol), ibuprofen (Advil, Motrin), or naproxen (Aleve). Read and follow all instructions on the label. · Be careful when taking over-the-counter cold or flu medicines and Tylenol at the same time. Many of these medicines have acetaminophen, which is Tylenol. Read the labels to make sure that you are not taking more than the recommended dose. Too much acetaminophen (Tylenol) can be harmful. · Drink plenty of fluids. Fluids may help soothe an irritated throat. Hot fluids, such as tea or soup, may help decrease throat pain. · Use over-the-counter throat lozenges to soothe pain. Regular cough drops or hard candy may also help. These should not be given to young children because of the risk of choking. · Do not smoke or allow others to smoke around you. If you need help quitting, talk to your doctor about stop-smoking programs and medicines.  These can increase your chances of quitting for good. · Use a vaporizer or humidifier to add moisture to your bedroom. Follow the directions for cleaning the machine. When should you call for help? Call your doctor now or seek immediate medical care if:    · You have new or worse trouble swallowing.     · Your sore throat gets much worse on one side.    Watch closely for changes in your health, and be sure to contact your doctor if you do not get better as expected. Where can you learn more? Go to http://jonas-chao.info/. Enter 062 [id number] 80 [id number] in the search box to learn more about \"Sore Throat: Care Instructions. \"  Current as of: March 28, 2018  Content Version: 11.8  © 0642-2075 Healthwise, Incorporated. Care instructions adapted under license by [employer] (which disclaims liability or warranty for this information). If you have questions about a medical condition or this instruction, always ask your healthcare professional. [address] any warranty or liability for your use of this information.

## 2018-10-12 NOTE — PROGRESS NOTES
ROOM # 9  Identified pt with two pt identifiers(name and ). Reviewed record in preparation for visit and have obtained necessary documentation. Chief Complaint   Patient presents with   [address] preferred language for health care discussion is english/other. Is the patient using any DME equipment during OV? Surinder Nettles is due for:  Health Maintenance Due   Topic    Influenza Age 5 to Adult      Health Maintenance reviewed and discussed per provider  Please order/place referral if appropriate. Advance Directive:  1. Do you have an advance directive in place? Patient Reply: NO    2. If not, would you like material regarding how to put one in place? NO    Coordination of Care:  1. Have you been to the ER, urgent care clinic since your last visit? Hospitalized since your last visit? NO    2. Have you seen or consulted any other health care providers outside of the [address] [place] [address] since your last visit? Include any pap smears or colon screening. NO    Patient is accompanied by self I have received verbal consent from Brandie Avelar to discuss any/all medical information while they are present in the room.     Learning Assessment:  Learning Assessment 10/30/2013 10/22/2012   PRIMARY LEARNER Patient Patient   PRIMARY LANGUAGE ENGLISH ENGLISH   LEARNER PREFERENCE PRIMARY READING READING   ANSWERED BY patient self   RELATIONSHIP SELF SELF     Depression Screening:  PHQ over the last two weeks 2017 10/1/2014   Little interest or pleasure in doing things Not at all Not at all   Feeling down, depressed, irritable, or hopeless Not at all Not at all   Total Score PHQ 2 0 0     Abuse Screening:  n/i  Fall Risk  n/i

## 2022-03-18 PROBLEM — J02.9 SORE THROAT: Status: ACTIVE | Noted: 2018-10-12

## 2022-03-20 PROBLEM — R09.81 SINUS CONGESTION: Status: ACTIVE | Noted: 2018-10-12

## 2024-03-22 ENCOUNTER — OFFICE VISIT (OUTPATIENT)
Facility: CLINIC | Age: 54
End: 2024-03-22

## 2024-03-22 ENCOUNTER — HOSPITAL ENCOUNTER (OUTPATIENT)
Facility: HOSPITAL | Age: 54
Setting detail: SPECIMEN
End: 2024-03-22
Payer: OTHER GOVERNMENT

## 2024-03-22 VITALS
TEMPERATURE: 97.3 F | SYSTOLIC BLOOD PRESSURE: 106 MMHG | OXYGEN SATURATION: 95 % | DIASTOLIC BLOOD PRESSURE: 68 MMHG | HEART RATE: 73 BPM | WEIGHT: 215.8 LBS | RESPIRATION RATE: 16 BRPM | HEIGHT: 70 IN | BODY MASS INDEX: 30.9 KG/M2

## 2024-03-22 DIAGNOSIS — M77.11 LATERAL EPICONDYLITIS OF BOTH ELBOWS: ICD-10-CM

## 2024-03-22 DIAGNOSIS — Z76.89 ENCOUNTER TO ESTABLISH CARE WITH NEW DOCTOR: ICD-10-CM

## 2024-03-22 DIAGNOSIS — Z13.220 SCREENING CHOLESTEROL LEVEL: ICD-10-CM

## 2024-03-22 DIAGNOSIS — M77.12 LATERAL EPICONDYLITIS OF BOTH ELBOWS: ICD-10-CM

## 2024-03-22 DIAGNOSIS — Z76.89 ENCOUNTER TO ESTABLISH CARE WITH NEW DOCTOR: Primary | ICD-10-CM

## 2024-03-22 DIAGNOSIS — Z13.1 ENCOUNTER FOR SCREENING FOR DIABETES MELLITUS: ICD-10-CM

## 2024-03-22 DIAGNOSIS — M94.0 COSTOCHONDRITIS: ICD-10-CM

## 2024-03-22 DIAGNOSIS — G47.33 OSA (OBSTRUCTIVE SLEEP APNEA): ICD-10-CM

## 2024-03-22 PROBLEM — R09.81 SINUS CONGESTION: Status: RESOLVED | Noted: 2018-10-12 | Resolved: 2024-03-22

## 2024-03-22 PROBLEM — J02.9 SORE THROAT: Status: RESOLVED | Noted: 2018-10-12 | Resolved: 2024-03-22

## 2024-03-22 LAB
ALBUMIN SERPL-MCNC: 4 G/DL (ref 3.4–5)
ALBUMIN/GLOB SERPL: 1.2 (ref 0.8–1.7)
ALP SERPL-CCNC: 56 U/L (ref 45–117)
ALT SERPL-CCNC: 43 U/L (ref 16–61)
ANION GAP SERPL CALC-SCNC: 4 MMOL/L (ref 3–18)
AST SERPL-CCNC: 20 U/L (ref 10–38)
BILIRUB SERPL-MCNC: 0.6 MG/DL (ref 0.2–1)
BUN SERPL-MCNC: 12 MG/DL (ref 7–18)
BUN/CREAT SERPL: 10 (ref 12–20)
CALCIUM SERPL-MCNC: 9.5 MG/DL (ref 8.5–10.1)
CHLORIDE SERPL-SCNC: 106 MMOL/L (ref 100–111)
CHOLEST SERPL-MCNC: 219 MG/DL
CO2 SERPL-SCNC: 30 MMOL/L (ref 21–32)
CREAT SERPL-MCNC: 1.21 MG/DL (ref 0.6–1.3)
EST. AVERAGE GLUCOSE BLD GHB EST-MCNC: 111 MG/DL
GLOBULIN SER CALC-MCNC: 3.3 G/DL (ref 2–4)
GLUCOSE SERPL-MCNC: 89 MG/DL (ref 74–99)
HBA1C MFR BLD: 5.5 % (ref 4.2–5.6)
HDLC SERPL-MCNC: 39 MG/DL (ref 40–60)
HDLC SERPL: 5.6 (ref 0–5)
LDLC SERPL CALC-MCNC: 148.8 MG/DL (ref 0–100)
LIPID PANEL: ABNORMAL
POTASSIUM SERPL-SCNC: 4 MMOL/L (ref 3.5–5.5)
PROT SERPL-MCNC: 7.3 G/DL (ref 6.4–8.2)
SODIUM SERPL-SCNC: 140 MMOL/L (ref 136–145)
TRIGL SERPL-MCNC: 156 MG/DL
VLDLC SERPL CALC-MCNC: 31.2 MG/DL

## 2024-03-22 PROCEDURE — 83036 HEMOGLOBIN GLYCOSYLATED A1C: CPT

## 2024-03-22 PROCEDURE — 80061 LIPID PANEL: CPT

## 2024-03-22 PROCEDURE — 36415 COLL VENOUS BLD VENIPUNCTURE: CPT

## 2024-03-22 PROCEDURE — 80053 COMPREHEN METABOLIC PANEL: CPT

## 2024-03-22 SDOH — ECONOMIC STABILITY: FOOD INSECURITY: WITHIN THE PAST 12 MONTHS, THE FOOD YOU BOUGHT JUST DIDN'T LAST AND YOU DIDN'T HAVE MONEY TO GET MORE.: NEVER TRUE

## 2024-03-22 SDOH — ECONOMIC STABILITY: INCOME INSECURITY: HOW HARD IS IT FOR YOU TO PAY FOR THE VERY BASICS LIKE FOOD, HOUSING, MEDICAL CARE, AND HEATING?: NOT HARD AT ALL

## 2024-03-22 SDOH — ECONOMIC STABILITY: HOUSING INSECURITY
IN THE LAST 12 MONTHS, WAS THERE A TIME WHEN YOU DID NOT HAVE A STEADY PLACE TO SLEEP OR SLEPT IN A SHELTER (INCLUDING NOW)?: NO

## 2024-03-22 SDOH — ECONOMIC STABILITY: FOOD INSECURITY: WITHIN THE PAST 12 MONTHS, YOU WORRIED THAT YOUR FOOD WOULD RUN OUT BEFORE YOU GOT MONEY TO BUY MORE.: NEVER TRUE

## 2024-03-22 ASSESSMENT — PATIENT HEALTH QUESTIONNAIRE - PHQ9
SUM OF ALL RESPONSES TO PHQ9 QUESTIONS 1 & 2: 0
SUM OF ALL RESPONSES TO PHQ QUESTIONS 1-9: 0
1. LITTLE INTEREST OR PLEASURE IN DOING THINGS: NOT AT ALL
2. FEELING DOWN, DEPRESSED OR HOPELESS: NOT AT ALL

## 2024-03-22 NOTE — PATIENT INSTRUCTIONS
It was nice meeting you today.  Please have your labs done either immediately after this visit, or you can schedule at the  to come back for labs.  Please do your labs at least a week before your next appointment.    If you have questions or concerns, My Chart is the fastest way to get in touch with me and the clinical staff.    Please arrive to the clinic at least 10 minutes before your appointment. This will insure you have the most amount of time with the medical provider.  If you arrive after the start of your appointment, you will have to reschedule.

## 2024-03-22 NOTE — PROGRESS NOTES
Fadi Cleveland is a 53 y.o. year old male who presents today for   Chief Complaint   Patient presents with    New Patient     Establishing care       Is someone accompanying this pt? no    Is the patient using any DME equipment during OV? no    Depression Screening:       3/22/2024     1:22 PM   PHQ-9 Questionaire   Little interest or pleasure in doing things 0   Feeling down, depressed, or hopeless 0   PHQ-9 Total Score 0       Abuse Screening:      3/22/2024     1:00 PM   AMB Abuse Screening   Do you ever feel afraid of your partner? N   Are you in a relationship with someone who physically or mentally threatens you? N   Is it safe for you to go home? Y       Learning Assessment:  Who is the primary learner? Patient    What is the preferred language for health care of the primary learner? ENGLISH    How does the primary learner prefer to learn new concepts? OTHER (COMMENT) all of the above   Answered By patient    Relationship to Learner SELF    Highest level of education completed by primary learner? > 4 YEARS OF COLLEGE    Are there any barriers / factors that could impact learning? NONE        Fall Risk:       No data to display                    Coordination of Care:   1. \"Have you been to the ER, urgent care clinic since your last visit?  Hospitalized since your last visit?\" no    2. \"Have you seen or consulted any other health care providers outside of the [facility] since your last visit?\" no    3. For patients aged 45-75: Has the patient had a colonoscopy / FIT/ Cologuard? yes    If the patient is female:    4. For patients aged 40-74: Has the patient had a mammogram within the past 2 years? N/a    5. For patients aged 21-65: Has the patient had a pap smear? N/a    Health Maintenance: reviewed and discussed and ordered per Provider.    Health Maintenance Due   Topic Date Due    Hepatitis B vaccine (1 of 3 - 3-dose series) Never done    Depression Screen  Never done    HIV screen  Never 
plan of care with the patient, accepted their input and they are in agreement with the treatment goals.     Follow-up and Dispositions    Return in about 2 weeks (around 4/5/2024) for virtual visit.         Roldan Louis MD  March 22, 2024

## 2024-04-15 ENCOUNTER — SCHEDULED TELEPHONE ENCOUNTER (OUTPATIENT)
Facility: CLINIC | Age: 54
End: 2024-04-15
Payer: OTHER GOVERNMENT

## 2024-04-15 DIAGNOSIS — Z12.11 ENCOUNTER FOR COLORECTAL CANCER SCREENING: ICD-10-CM

## 2024-04-15 DIAGNOSIS — Z12.12 ENCOUNTER FOR COLORECTAL CANCER SCREENING: ICD-10-CM

## 2024-04-15 DIAGNOSIS — E78.00 PURE HYPERCHOLESTEROLEMIA: Primary | ICD-10-CM

## 2024-04-15 PROCEDURE — 99212 OFFICE O/P EST SF 10 MIN: CPT | Performed by: STUDENT IN AN ORGANIZED HEALTH CARE EDUCATION/TRAINING PROGRAM

## 2024-04-15 NOTE — PROGRESS NOTES
Total Time: minutes: 5-10 minutes     Fadi Cleveland was evaluated through a synchronous (real-time) audio encounter. Patient identification was verified at the start of the visit. He (or guardian if applicable) is aware that this is a billable service, which includes applicable co-pays. This visit was conducted with the patient's (and/or legal guardian's) verbal consent. He has not had a related appointment within my department in the past 7 days or scheduled within the next 24 hours.   The patient was located at Home: [address] [place] [id number].  The provider was located at Facility (Appt Dept): [address] [place] [address], [address]  [place] [address].        Fadi Cleveland is a 53 y.o. male evaluated via telephone on 4/15/2024 for No chief complaint on file.  .      Assessment & Plan   1. Pure hypercholesterolemia  Assessment & Plan:  - discussed improving lifestyle   2. Encounter for colorectal cancer screening  -     Cologuard (Fecal DNA Colorectal Cancer Screening)    No follow-ups on file.      Subjective     HPI    Lab results  - low risk HLD    HM  - colo: cologuard due    Review of Systems      Objective   Patient-Reported Vitals  No data recorded       Physical Exam  A&O x 3  Speaking in full sentences without difficulty    Roldan Louis MD

## 2024-05-23 LAB — NONINV COLON CA DNA+OCC BLD SCRN STL QL: NEGATIVE

## 2025-01-24 ENCOUNTER — OFFICE VISIT (OUTPATIENT)
Facility: CLINIC | Age: 55
End: 2025-01-24

## 2025-01-24 VITALS
OXYGEN SATURATION: 99 % | RESPIRATION RATE: 17 BRPM | SYSTOLIC BLOOD PRESSURE: 121 MMHG | TEMPERATURE: 98 F | DIASTOLIC BLOOD PRESSURE: 73 MMHG | HEART RATE: 71 BPM | BODY MASS INDEX: 31.64 KG/M2 | HEIGHT: 70 IN | WEIGHT: 221 LBS

## 2025-01-24 DIAGNOSIS — Z12.5 PROSTATE CANCER SCREENING: ICD-10-CM

## 2025-01-24 DIAGNOSIS — Z00.00 ENCOUNTER FOR WELL ADULT EXAM WITHOUT ABNORMAL FINDINGS: Primary | ICD-10-CM

## 2025-01-24 DIAGNOSIS — M77.12 LATERAL EPICONDYLITIS OF BOTH ELBOWS: ICD-10-CM

## 2025-01-24 DIAGNOSIS — E66.09 CLASS 1 OBESITY DUE TO EXCESS CALORIES WITHOUT SERIOUS COMORBIDITY WITH BODY MASS INDEX (BMI) OF 31.0 TO 31.9 IN ADULT: ICD-10-CM

## 2025-01-24 DIAGNOSIS — H53.9 VISION CHANGES: ICD-10-CM

## 2025-01-24 DIAGNOSIS — M54.2 NECK PAIN, CHRONIC: ICD-10-CM

## 2025-01-24 DIAGNOSIS — M77.11 LATERAL EPICONDYLITIS OF BOTH ELBOWS: ICD-10-CM

## 2025-01-24 DIAGNOSIS — G47.33 OSA (OBSTRUCTIVE SLEEP APNEA): ICD-10-CM

## 2025-01-24 DIAGNOSIS — G89.29 NECK PAIN, CHRONIC: ICD-10-CM

## 2025-01-24 DIAGNOSIS — E66.811 CLASS 1 OBESITY DUE TO EXCESS CALORIES WITHOUT SERIOUS COMORBIDITY WITH BODY MASS INDEX (BMI) OF 31.0 TO 31.9 IN ADULT: ICD-10-CM

## 2025-01-24 DIAGNOSIS — L57.0 ACTINIC KERATOSIS: ICD-10-CM

## 2025-01-24 DIAGNOSIS — E78.00 PURE HYPERCHOLESTEROLEMIA: ICD-10-CM

## 2025-01-24 SDOH — ECONOMIC STABILITY: FOOD INSECURITY: WITHIN THE PAST 12 MONTHS, THE FOOD YOU BOUGHT JUST DIDN'T LAST AND YOU DIDN'T HAVE MONEY TO GET MORE.: NEVER TRUE

## 2025-01-24 SDOH — ECONOMIC STABILITY: FOOD INSECURITY: WITHIN THE PAST 12 MONTHS, YOU WORRIED THAT YOUR FOOD WOULD RUN OUT BEFORE YOU GOT MONEY TO BUY MORE.: NEVER TRUE

## 2025-01-24 ASSESSMENT — PATIENT HEALTH QUESTIONNAIRE - PHQ9
1. LITTLE INTEREST OR PLEASURE IN DOING THINGS: NOT AT ALL
SUM OF ALL RESPONSES TO PHQ9 QUESTIONS 1 & 2: 0
SUM OF ALL RESPONSES TO PHQ QUESTIONS 1-9: 0
2. FEELING DOWN, DEPRESSED OR HOPELESS: NOT AT ALL

## 2025-01-24 NOTE — PROGRESS NOTES
Fadi Cleveland is a 54 y.o. year old male who presents today for   Chief Complaint   Patient presents with    Annual Exam       \"Have you been to the ER, urgent care clinic since your last visit?  Hospitalized since your last visit?\"    no    “Have you seen or consulted any other health care providers outside our system since your last visit?”    No        Click Here for Release of Records Request    - MANISHA Amezcua  [facility]  Phone: [phone]  Fax: [phone]

## 2025-01-24 NOTE — PATIENT INSTRUCTIONS
Dr. FERN Fuller  [phone]  [facility]  [address]     For your neck: do the stretches; chiropractor might help; if no progress is made, please do the neck xray    Well Visit, Ages 18 to 65: Care Instructions  Well visits can help you stay healthy. Your doctor has checked your overall health and may have suggested ways to take good care of yourself. Your doctor also may have recommended tests. You can help prevent illness with healthy eating, good sleep, vaccinations, regular exercise, and other steps.    Get the tests that you and your doctor decide on. Depending on your age and risks, examples might include screening for diabetes; hepatitis C; HIV; and cervical, breast, lung, and colon cancer. Screening helps find diseases before any symptoms appear.   Eat healthy foods. Choose fruits, vegetables, whole grains, lean protein, and low-fat dairy foods. Limit saturated fat and reduce salt.     Limit alcohol. Men should have no more than 2 drinks a day. Women should have no more than 1. For some people, no alcohol is the best choice.   Exercise. Get at least 30 minutes of exercise on most days of the week. Walking can be a good choice.     Reach and stay at your healthy weight. This will lower your risk for many health problems.   Take care of your mental health. Try to stay connected with friends, family, and community, and find ways to manage stress.     If you're feeling depressed or hopeless, talk to someone. A counselor can help. If you don't have a counselor, talk to your doctor.   Talk to your doctor if you think you may have a problem with alcohol or drug use. This includes prescription medicines, marijuana, and other drugs.     Avoid tobacco and nicotine: Don't smoke, vape, or chew. If you need help quitting, talk to your doctor.   Practice safer sex. Getting tested, using condoms or dental dams, and limiting sex partners can help prevent STIs.     Use birth control if

## 2025-01-24 NOTE — PROGRESS NOTES
Well Adult Note  Name: Fadi Cleveland Today’s Date: 2025   MRN: [id number] Sex: Male   Age: 54 y.o. Ethnicity: Non- / Non    : 1970 Race: White (non-)      Fadi Cleveland is here for a well adult exam.       Assessment & Plan   Encounter for well adult exam without abnormal findings  -     ESTABLISHED, MOD MDM, 30-39 MIN [17074]  MO (obstructive sleep apnea)  -     Comprehensive Metabolic Panel; Future  -     CBC; Future  Pure hypercholesterolemia  -     Lipid Panel; Future  Prostate cancer screening  -     PSA Screening; Future  Class 1 obesity due to excess calories without serious comorbidity with body mass index (BMI) of 31.0 to 31.9 in adult  -     Hemoglobin A1C; Future  Vision changes  Neck pain, chronic  -     XR CERVICAL SPINE (4-5 VIEWS); Future  Lateral epicondylitis of both elbows  Actinic keratosis  -     External Referral To Dermatology      Return in 1 year (on 2026) for Annual.       Subjective   History:  Vision changes, transient  - saw bright line and fuzzy vision for 15 minutes, followed by   - occurred two days in a row last week; was spending a lot of time in front computers  - occurred one year time before several years ago  - last saw opto on 10/10; retina exam was negative    Neck stiffness  - intermittent when waking  - also has some crepitus    BL lateral epicondylitis  - conducting PT for R  - noticing more pain in the L  Update (25)  - R elbow better; L elbow is tolerable     Actinic Keratosis  - L shoulder; has had multiple over the past year    Review of Systems    No Known Allergies  Prior to Visit Medications    Not on File     No past medical history on file.  No past surgical history on file.  Family History   Problem Relation Age of Onset    No Known Problems Mother     No Known Problems Father     No Known Problems Sister     No Known Problems Brother      Social History     Tobacco Use    Smoking status: Never    Smokeless

## 2025-03-28 ENCOUNTER — OFFICE VISIT (OUTPATIENT)
Facility: CLINIC | Age: 55
End: 2025-03-28
Payer: OTHER GOVERNMENT

## 2025-03-28 VITALS
HEART RATE: 84 BPM | BODY MASS INDEX: 31.47 KG/M2 | WEIGHT: 219.8 LBS | RESPIRATION RATE: 11 BRPM | OXYGEN SATURATION: 95 % | DIASTOLIC BLOOD PRESSURE: 66 MMHG | HEIGHT: 70 IN | TEMPERATURE: 97.3 F | SYSTOLIC BLOOD PRESSURE: 95 MMHG

## 2025-03-28 DIAGNOSIS — M54.2 NECK PAIN, CHRONIC: Primary | ICD-10-CM

## 2025-03-28 DIAGNOSIS — M54.9 UPPER BACK PAIN: ICD-10-CM

## 2025-03-28 DIAGNOSIS — G89.29 NECK PAIN, CHRONIC: Primary | ICD-10-CM

## 2025-03-28 PROCEDURE — G2211 COMPLEX E/M VISIT ADD ON: HCPCS | Performed by: STUDENT IN AN ORGANIZED HEALTH CARE EDUCATION/TRAINING PROGRAM

## 2025-03-28 PROCEDURE — 99214 OFFICE O/P EST MOD 30 MIN: CPT | Performed by: STUDENT IN AN ORGANIZED HEALTH CARE EDUCATION/TRAINING PROGRAM

## 2025-03-28 NOTE — PROGRESS NOTES
Fadi Cleveland is a 54 y.o. year old male who presents today for   Chief Complaint   Patient presents with    Follow-up    Pain    Neck Pain    Back Pain     Upper back        \"Have you been to the ER, urgent care clinic since your last visit?  Hospitalized since your last visit?\"   NO      “Have you seen or consulted any other health care providers outside our system since your last visit?”   NO           Siddhartha Dill CMA  [facility]  Ph: [phone]  Fax: [phone]

## 2025-03-28 NOTE — PATIENT INSTRUCTIONS
Please take Ibuprofen 600mg every 6 hours as needed for pain.  Make sure to take this with food as anti-inflammatories can cause gastrointestinal irritation including hemorrhage and kidney injuries.  You may also take Tylenol 500mg every 6 hours.  You can alternate these two medications every 3 hours as needed (for example: 6am Ibuprofen, 9am Tylenol, 12pm Ibuprofen, 3pm Tylenol...).   Taking B vitamins with the ibuprofen may improve pain control. At least 50mg B1, 50mg B6, and 2000 micrograms B12. There are OTC combination pills available.  Your pharmacist can help you find an appropriate brand.

## 2025-03-28 NOTE — PROGRESS NOTES
Fadi Cleveland (:  1970) is a 54 y.o. male here for evaluation of the following chief complaint(s):  Follow-up, Pain, Neck Pain, and Back Pain (Upper back)        ASSESSMENT/PLAN:  1. Neck pain, chronic  Assessment & Plan:  - likely muscle spasms, given TTP of trap muscle bellies  - targetted stretching has lost efficacy; ref toPT    Orders:  -     [facility]  2. Upper back pain  Assessment & Plan:  - likely muscle spasms, given TTP of trap muscle bellies  - targetted stretching has lost efficacy; ref toPT   Orders:  -     [facility]          SUBJECTIVE/OBJECTIVE:  Pain  Associated symptoms include neck pain.   Neck Pain     Back Pain      Neck stiffness  - intermittent when waking  - also has some crepitus  Update (25)  - home stretches have helped with pain and crepitus,   - for the past 2 weeks, worsening tightness mid thoracic and new tightness over sternum  - denies ACS or pulm sxs  - wore when laying in bed at an incline  - ibuprofen very rarely    ROS as stated above.    BP 95/66 (BP Site: Left Upper Arm, Patient Position: Sitting, BP Cuff Size: Large Adult)   Pulse 84   Temp 97.3 °F (36.3 °C) (Temporal)   Resp 11   Ht 1.778 m (5' 10\")   Wt 99.7 kg (219 lb 12.8 oz)   SpO2 95%   BMI 31.54 kg/m²     Physical Exam  Constitutional:       Appearance: Normal appearance.   Pulmonary:      Effort: Pulmonary effort is normal.   Musculoskeletal:      Comments: Thoracic spine: moderate hypertonicity over trap bilaterally  Chest: minor TTP with deep palpation   Neurological:      Mental Status: He is alert and oriented to person, place, and time.   Psychiatric:         Mood and Affect: Mood normal.               An electronic signature was used to authenticate this note.    --Roldan Louis MD

## 2025-03-31 NOTE — ASSESSMENT & PLAN NOTE
- likely muscle spasms, given TTP of trap muscle bellies  - targetted stretching has lost efficacy; ref toPT

## 2025-04-09 ENCOUNTER — HOSPITAL ENCOUNTER (OUTPATIENT)
Facility: HOSPITAL | Age: 55
Setting detail: SPECIMEN
Discharge: HOME | End: 2025-04-12
Payer: OTHER GOVERNMENT

## 2025-04-09 DIAGNOSIS — Z12.5 PROSTATE CANCER SCREENING: ICD-10-CM

## 2025-04-09 DIAGNOSIS — E78.00 PURE HYPERCHOLESTEROLEMIA: ICD-10-CM

## 2025-04-09 DIAGNOSIS — E66.811 CLASS 1 OBESITY DUE TO EXCESS CALORIES WITHOUT SERIOUS COMORBIDITY WITH BODY MASS INDEX (BMI) OF 31.0 TO 31.9 IN ADULT: ICD-10-CM

## 2025-04-09 DIAGNOSIS — E66.09 CLASS 1 OBESITY DUE TO EXCESS CALORIES WITHOUT SERIOUS COMORBIDITY WITH BODY MASS INDEX (BMI) OF 31.0 TO 31.9 IN ADULT: ICD-10-CM

## 2025-04-09 DIAGNOSIS — G47.33 OSA (OBSTRUCTIVE SLEEP APNEA): ICD-10-CM

## 2025-04-09 LAB
ALBUMIN SERPL-MCNC: 4 G/DL (ref 3.4–5)
ALBUMIN/GLOB SERPL: 1.3 (ref 0.8–1.7)
ALP SERPL-CCNC: 52 U/L (ref 45–117)
ALT SERPL-CCNC: 48 U/L (ref 16–61)
ANION GAP SERPL CALC-SCNC: 6 MMOL/L (ref 3–18)
AST SERPL-CCNC: 21 U/L (ref 10–38)
BILIRUB SERPL-MCNC: 0.5 MG/DL (ref 0.2–1)
BUN SERPL-MCNC: 11 MG/DL (ref 7–18)
BUN/CREAT SERPL: 9 (ref 12–20)
CALCIUM SERPL-MCNC: 9.4 MG/DL (ref 8.5–10.1)
CHLORIDE SERPL-SCNC: 107 MMOL/L (ref 100–111)
CHOLEST SERPL-MCNC: 205 MG/DL
CO2 SERPL-SCNC: 29 MMOL/L (ref 21–32)
CREAT SERPL-MCNC: 1.21 MG/DL (ref 0.6–1.3)
ERYTHROCYTE [DISTWIDTH] IN BLOOD BY AUTOMATED COUNT: 12.9 % (ref 11.6–14.5)
EST. AVERAGE GLUCOSE BLD GHB EST-MCNC: 117 MG/DL
GLOBULIN SER CALC-MCNC: 3.2 G/DL (ref 2–4)
GLUCOSE SERPL-MCNC: 110 MG/DL (ref 74–99)
HBA1C MFR BLD: 5.7 % (ref 4.2–5.6)
HCT VFR BLD AUTO: 50.7 % (ref 36–48)
HDLC SERPL-MCNC: 43 MG/DL (ref 40–60)
HDLC SERPL: 4.8 (ref 0–5)
HGB BLD-MCNC: 16.8 G/DL (ref 13–16)
LDLC SERPL CALC-MCNC: 135.4 MG/DL (ref 0–100)
LIPID PANEL: ABNORMAL
MCH RBC QN AUTO: 29.5 PG (ref 24–34)
MCHC RBC AUTO-ENTMCNC: 33.1 G/DL (ref 31–37)
MCV RBC AUTO: 88.9 FL (ref 78–100)
NRBC # BLD: 0 K/UL (ref 0–0.01)
NRBC BLD-RTO: 0 PER 100 WBC
PLATELET # BLD AUTO: 226 K/UL (ref 135–420)
PMV BLD AUTO: 10 FL (ref 9.2–11.8)
POTASSIUM SERPL-SCNC: 4.2 MMOL/L (ref 3.5–5.5)
PROT SERPL-MCNC: 7.2 G/DL (ref 6.4–8.2)
RBC # BLD AUTO: 5.7 M/UL (ref 4.35–5.65)
SODIUM SERPL-SCNC: 142 MMOL/L (ref 136–145)
TRIGL SERPL-MCNC: 133 MG/DL
VLDLC SERPL CALC-MCNC: 26.6 MG/DL
WBC # BLD AUTO: 5.7 K/UL (ref 4.6–13.2)

## 2025-04-09 PROCEDURE — 36415 COLL VENOUS BLD VENIPUNCTURE: CPT

## 2025-04-09 PROCEDURE — G0103 PSA SCREENING: HCPCS

## 2025-04-09 PROCEDURE — 80053 COMPREHEN METABOLIC PANEL: CPT

## 2025-04-09 PROCEDURE — 80061 LIPID PANEL: CPT

## 2025-04-09 PROCEDURE — 85027 COMPLETE CBC AUTOMATED: CPT

## 2025-04-09 PROCEDURE — 83036 HEMOGLOBIN GLYCOSYLATED A1C: CPT

## 2025-04-10 LAB — PSA SERPL-MCNC: 1 NG/ML (ref 0–4)

## 2025-04-23 ENCOUNTER — HOSPITAL ENCOUNTER (OUTPATIENT)
Facility: HOSPITAL | Age: 55
Setting detail: RECURRING SERIES
Discharge: HOME | End: 2025-04-26
Payer: OTHER GOVERNMENT

## 2025-04-23 PROCEDURE — 97140 MANUAL THERAPY 1/> REGIONS: CPT

## 2025-04-23 PROCEDURE — 97110 THERAPEUTIC EXERCISES: CPT

## 2025-04-23 PROCEDURE — 97535 SELF CARE MNGMENT TRAINING: CPT

## 2025-04-23 PROCEDURE — 97161 PT EVAL LOW COMPLEX 20 MIN: CPT

## 2025-04-23 NOTE — PROGRESS NOTES
[facility]  [address] [address] Phone: [phone] Fax   Plan of Care / Statement of Necessity for Physical Therapy Services     Patient Name: Fadi Cleveland : 1970   Medical   Diagnosis: Neck pain, chronic  Upper back pain Treatment Diagnosis: M54.2  NECK PAIN and M54.6  THORACIC PAIN      Onset Date: 4 weeks ago Payor :  Payor:  EAST / Plan:  EAST SELECT / Product Type: *No Product type* /    Referral Source: Roldan Louis,* Start of Care (SOC): 2025   Prior Hospitalization: See medical history Provider #: [id number]   Prior Level of Function: Independent with ADLs, functional, and work activities.   Comorbidities: Other: none     Assessment / key information:    Pt is a 54 year old male who presents to therapy today with neck and thoracic pain. Pt states that his symptoms began about 4 weeks ago with insidious onset. Pt reports his pain started in his chest and moved towards his ribs and back. He reports being checked for cardiologic mechanism of this pain and states testing was negative. He reports this pain is still in the thoracic/rib region but also has pain in the lower neck region and in the B traps. His pain worsens of symptoms with slouching and wakes up with stiffness in the neck. He denies numbness/tingling in the B arms/hands. Pt demonstrated decreased AROM, decreased flexibility, decreased joint mobility, and pain with PA mobility. Pt would benefit from skilled physical therapy to improve the above impairments to help the pt restore function and return to performing ADLs, functional, work, and daily activities.     Evaluation Complexity:  History:  LOW Complexity : Zero comorbidities / personal factors that will impact the outcome / POC; Examination:  MEDIUM Complexity : 3 Standardized tests and measures addressin body structure, function, activity limitation and / or participation

## 2025-04-23 NOTE — PROGRESS NOTES
PHYSICAL / OCCUPATIONAL THERAPY - DAILY TREATMENT NOTE    Patient Name: Fadi Cleveland    Date: 2025    : 1970  Insurance: Payor:  EAST / Plan: [facility] SELECT / Product Type: *No Product type* /      Patient  verified Yes     Visit #   Current / Total 1 10   Time   In / Out 9:43 10:33   Pain   In / Out 2 2   Subjective Functional Status/Changes: See POC     TREATMENT AREA =  Neck pain, chronic  Upper back pain    OBJECTIVE    18 min [x]Eval        X untimed      Therapeutic Procedures:    Tx Min Billable or 1:1 Min (if diff from Tx Min) Procedure, Rationale, Specifics   12  68881 Therapeutic Exercise (timed):  increase ROM, strength, coordination, balance, and proprioception to improve patient's ability to progress to PLOF and address remaining functional goals. (see flow sheet as applicable)     Details if applicable:  HEP instruction/demonstration     10  50674 Manual Therapy (timed):  decrease pain, increase ROM, increase tissue extensibility, and increase postural awareness to improve patient's ability to progress to PLOF and address remaining functional goals.  The manual therapy interventions were performed at a separate and distinct time from the therapeutic activities interventions . (see flow sheet as applicable)     Details if applicable:  in prone: grade 5 PA mobs to lower/mid/upper t/s, MET to the B scalenes to improve neck ROM   10  38505 Self Care/Home Management (timed):  improve patient knowledge and understanding of home injury/symptom/pain management, positioning, posture/ergonomics, home safety, activity modification, transfer techniques, and joint protection strategies  to improve patient's ability to progress to PLOF and address remaining functional goals.  (see flow sheet as applicable)     Details if applicable:  Pt education on relevant anatomy/physiology, pt education on therapy progression and expectations.     32  [facility] Totals Reminder: bill using total

## 2025-04-28 ENCOUNTER — HOSPITAL ENCOUNTER (OUTPATIENT)
Facility: HOSPITAL | Age: 55
Setting detail: RECURRING SERIES
Discharge: HOME | End: 2025-05-01
Payer: OTHER GOVERNMENT

## 2025-04-28 PROCEDURE — 97112 NEUROMUSCULAR REEDUCATION: CPT

## 2025-04-28 PROCEDURE — 97140 MANUAL THERAPY 1/> REGIONS: CPT

## 2025-04-28 NOTE — PROGRESS NOTES
2 units; 38-52 min = 3 units; 53-67 min = 4 units; 68-82 min = 5 units   Total Total     Charge Capture    [x]  Patient Education billed concurrently with other procedures   [x] Review HEP    [] Progressed/Changed HEP, detail:    [] Other detail:       Objective Information/Functional Measures/Assessment  Reported no change in pain post session today. Initiated exercises/interventions per flow sheet. Non-painful cavitation noted with PA mobs to the lower t/s. Minimal change in pain with PA mobs to the lower t/s and lower c/s. Tenderness and tightness noted to the left UT/levator. Reported fatigue with prone scap retraction. May benefit with performing more exercises in the clinic compared to manual interventions after pt report of minimal change with manual interventions but fatigue with exercises. Continue POC as tolerated to improve pain and activity tolerance.     Patient will continue to benefit from skilled PT / OT services to modify and progress therapeutic interventions, analyze and address functional mobility deficits, analyze and address ROM deficits, analyze and address strength deficits, analyze and address soft tissue restrictions, analyze and cue for proper movement patterns, analyze and modify for postural abnormalities, analyze and address imbalance/dizziness, and instruct in home and community integration to address functional deficits and attain remaining goals.    Progress toward goals / Updated goals:  []  See Progress Note/Recertification  Short Term Goals: To be accomplished in 2 treatments   Pt will report compliance and independence to HEP to help the pt manage their pain and symptoms.  Status at last note/certification:  established  Current: reports initial compliance 4/28/2025  Long Term Goals: To be accomplished in 10 treatments  Pt will improve his NDI score to 10% to improve ability to perform ADLs.  Status at last note/certification: 20%  2.  Pt will increase AROM c/s right SB to WNL,

## 2025-05-09 ENCOUNTER — HOSPITAL ENCOUNTER (OUTPATIENT)
Facility: HOSPITAL | Age: 55
Setting detail: RECURRING SERIES
Discharge: HOME | End: 2025-05-12
Payer: OTHER GOVERNMENT

## 2025-05-09 PROCEDURE — 97112 NEUROMUSCULAR REEDUCATION: CPT

## 2025-05-09 PROCEDURE — 97110 THERAPEUTIC EXERCISES: CPT

## 2025-05-09 PROCEDURE — G0283 ELEC STIM OTHER THAN WOUND: HCPCS

## 2025-05-09 PROCEDURE — 97140 MANUAL THERAPY 1/> REGIONS: CPT

## 2025-05-09 NOTE — PROGRESS NOTES
PHYSICAL / OCCUPATIONAL THERAPY - DAILY TREATMENT NOTE    Patient Name: Fadi Cleveland    Date: 2025    : 1970  Insurance: Payor:  EAST / Plan:  EAST SELECT / Product Type: *No Product type* /      Patient  verified Yes     Visit #   Current / Total 3 10   Time   In / Out 11:40 am 12:37 pm   Pain   In / Out 1-2/10 0/10   Subjective Functional Status/Changes: Patient notes some improvement in symptoms since last session. He reports difficulty modifying his work station      TREATMENT AREA =  Neck pain, chronic  Upper back pain  Neck pain    OBJECTIVE  Modalities Rationale:     decrease pain to improve patient's ability to progress to PLOF and address remaining functional goals.  10 min [x] Estim Unattended, type/location: IFC to (B) UT; semi-reclined with legs elevated on wedge                                        [x]  w/heat   Skin assessment post-treatment:   Intact       Therapeutic Procedures:  Tx Min Procedure, Rationale, Specifics   15 [id number] Manual Therapy (timed):  decrease pain, increase ROM, increase tissue extensibility, and increase postural awareness to improve patient's ability to progress to PLOF and address remaining functional goals.  The manual therapy interventions were performed at a separate and distinct time from the therapeutic activities interventions . (see flow sheet as applicable)     Details if applicable:  in prone: grade 2-3 PA mobs to lower and mid t/s, grade 2-3 PA mobs to C4-5, gentle manual traction     14 92522 Neuromuscular Re-Education (timed):  improve balance, coordination, kinesthetic sense, posture, core stability and proprioception to improve patient's ability to develop conscious control of individual muscles and awareness of position of extremities in order to progress to PLOF and address remaining functional goals. (see flow sheet as applicable)     Details if applicable:  scapular re-education     18 56876 Therapeutic Exercise (timed):

## 2025-05-16 ENCOUNTER — HOSPITAL ENCOUNTER (OUTPATIENT)
Facility: HOSPITAL | Age: 55
Setting detail: RECURRING SERIES
Discharge: HOME | End: 2025-05-19
Payer: OTHER GOVERNMENT

## 2025-05-16 PROCEDURE — 97110 THERAPEUTIC EXERCISES: CPT

## 2025-05-16 PROCEDURE — 97140 MANUAL THERAPY 1/> REGIONS: CPT

## 2025-05-16 PROCEDURE — 97112 NEUROMUSCULAR REEDUCATION: CPT

## 2025-05-16 PROCEDURE — G0283 ELEC STIM OTHER THAN WOUND: HCPCS

## 2025-05-16 NOTE — PROGRESS NOTES
PHYSICAL / OCCUPATIONAL THERAPY - DAILY TREATMENT NOTE    Patient Name: Fadi Cleveland    Date: 2025    : 1970  Insurance: Payor:  EAST / Plan: [employer] EAST SELECT / Product Type: *No Product type* /      Patient  verified Yes     Visit #   Current / Total 4 10   Time   In / Out 9:40 am 10:32 am   Pain   In / Out 0/10 0/10   Subjective Functional Status/Changes: \"Not having any pain today, just stiffness.\"     TREATMENT AREA =  Neck pain, chronic  Upper back pain  Neck pain     OBJECTIVE  Modalities Rationale:     decrease pain to improve patient's ability to progress to PLOF and address remaining functional goals.  10 min [x] Estim Unattended, type/location: IFC to (B) UT; prone with neck in mild flexion                                     [x]  w/heat   Skin assessment post-treatment:   Intact        Therapeutic Procedures:  Tx Min Billable or 1:1 Min (if diff from Tx Min) Procedure, Rationale, Specifics   14 10 01016 Therapeutic Exercise (timed):  increase ROM, strength, coordination, balance, and proprioception to improve patient's ability to progress to PLOF and address remaining functional goals. (see flow sheet as applicable)     Details if applicable:  strengthening, stretching      16 16 92515 Neuromuscular Re-Education (timed):  improve balance, coordination, kinesthetic sense, posture, core stability and proprioception to improve patient's ability to develop conscious control of individual muscles and awareness of position of extremities in order to progress to PLOF and address remaining functional goals. (see flow sheet as applicable)     Details if applicable:  scapular re-education      12 12 47670 Manual Therapy (timed):  decrease pain, increase ROM, and increase tissue extensibility to improve patient's ability to progress to PLOF and address remaining functional goals.  The manual therapy interventions were performed at a separate and distinct time from the therapeutic

## 2025-05-23 ENCOUNTER — HOSPITAL ENCOUNTER (OUTPATIENT)
Facility: HOSPITAL | Age: 55
Setting detail: RECURRING SERIES
Discharge: HOME | End: 2025-05-26
Payer: OTHER GOVERNMENT

## 2025-05-23 PROCEDURE — 97110 THERAPEUTIC EXERCISES: CPT

## 2025-05-23 PROCEDURE — 97112 NEUROMUSCULAR REEDUCATION: CPT

## 2025-05-23 PROCEDURE — 97140 MANUAL THERAPY 1/> REGIONS: CPT

## 2025-05-23 PROCEDURE — G0283 ELEC STIM OTHER THAN WOUND: HCPCS

## 2025-05-23 NOTE — PROGRESS NOTES
[facility] [place]   [address] [place] [address]  Phone: [phone] Fax: [phone]  PROGRESS NOTE  Patient Name: Fadi Cleveland : 1970   Treatment/Medical Diagnosis: Neck pain, chronic  Upper back pain  Neck pain   Referral Source:  Payor Roldan Louis,*  Payor:  EAST / Plan:  EAST SELECT / Product Type: *No Product type* /      Date of Initial Visit: 2025 Attended Visits: 5 Missed Visits: 0     SUMMARY OF TREATMENT  Pt has been seen 5 times during this plan of care, therapy has been focusing on postural re-education, strengthening, mobility, and decreasing pain. Pt has made functional gains in his ability to perform work duties without excessive increases in pain, however continues to report tightness and pain. Pt has additionally made objective gains in cervical rotation, particularly rotation to the left. Pt demos and reports increased understanding of condition and management strategies.     CURRENT STATUS  Shoulder/Scapular Screen: [x] WNL    [] Abnormal:    Active Movements:   ROM AROM  Comments:pain, area   Forward flexion 42     Extension 45     SB right 25     SB left  15  Reports tightness   Rotation right 45     Rotation left 73       Thoracic Spine: [] N/A    [] WNL   [x] Other: Mild limitations, thoracic kyphosis and FHP       Progress toward goals / Updated goals:  [x]  See Progress Note/Recertification  Short Term Goals: To be accomplished in 2 treatments   Pt will report compliance and independence to [facility] to help the pt manage their pain and symptoms.  Status at last note/certification:  established  MET: reports initial compliance 2025  Long Term Goals: To be accomplished in 10 treatments  Pt will improve his NDI score to 10% to improve ability to perform ADLs.  Status at last note/certification: 20%  Current (): 14%    2.  Pt will increase AROM c/s right SB to WNL, left SB

## 2025-05-23 NOTE — PROGRESS NOTES
PHYSICAL / OCCUPATIONAL THERAPY - DAILY TREATMENT NOTE    Patient Name: Fadi Cleveland    Date: 2025    : 1970  Insurance: Payor:  EAST / Plan: [employer] EAST SELECT / Product Type: *No Product type* /      Patient  verified yes     Visit #   Current / Total 5 10   Time   In / Out 0815 0928   Pain   In / Out 3/10 1-2/10   Subjective Functional Status/Changes: Pt reports mild increase in pain over the past few days secondary to long drive for work and sleeping on a hotel bed.      TREATMENT AREA =  Neck pain, chronic  Upper back pain  Neck pain     OBJECTIVE  Modalities Rationale:     decrease pain to improve patient's ability to progress to PLOF and address remaining functional goals.  10 min [x] Estim Unattended, type/location: IFC to (B) UT; prone with neck in mild flexion                                     [x]  w/heat   Skin assessment post-treatment:   Intact        Therapeutic Procedures:  Tx Min Billable or 1:1 Min (if diff from Tx Min) Procedure, Rationale, Specifics   18 18 46926 Therapeutic Exercise (timed):  increase ROM, strength, coordination, balance, and proprioception to improve patient's ability to progress to PLOF and address remaining functional goals. (see flow sheet as applicable)     Details if applicable:  strengthening, stretching      30 30 41211 Neuromuscular Re-Education (timed):  improve balance, coordination, kinesthetic sense, posture, core stability and proprioception to improve patient's ability to develop conscious control of individual muscles and awareness of position of extremities in order to progress to PLOF and address remaining functional goals. (see flow sheet as applicable)     Details if applicable:  scapular re-education for postural control     15 15 60724 Manual Therapy (timed):  decrease pain, increase ROM, and increase tissue extensibility to improve patient's ability to progress to PLOF and address remaining functional goals.  The manual

## 2025-05-29 ENCOUNTER — HOSPITAL ENCOUNTER (OUTPATIENT)
Facility: HOSPITAL | Age: 55
Setting detail: RECURRING SERIES
End: 2025-05-29
Payer: OTHER GOVERNMENT

## 2025-05-29 PROCEDURE — 97140 MANUAL THERAPY 1/> REGIONS: CPT

## 2025-05-29 PROCEDURE — 97110 THERAPEUTIC EXERCISES: CPT

## 2025-05-29 PROCEDURE — G0283 ELEC STIM OTHER THAN WOUND: HCPCS

## 2025-05-29 NOTE — PROGRESS NOTES
PHYSICAL / OCCUPATIONAL THERAPY - DAILY TREATMENT NOTE (updated )    Patient Name: Fadi Cleveland    Date: 2025    : 1970  Insurance: Payor:  EAST / Plan: [employer] [facility] [employer] / Product Type: *No Product type* /      Patient  verified yes     Visit #   Current / Total 1 4 Total Time   Time   In / Out 9:00 9:55 55   Pain   In / Out 1 0    Subjective Functional Status/Changes: I feel like I have a pebble in my upper back.     TREATMENT AREA = Neck pain, chronic  Upper back pain  Neck pain    OBJECTIVE    Modalities Rationale:     decrease pain to improve patient's ability to progress to PLOF and address remaining functional goals.             --  10 min [x] Estim Unattended,   Type:  Position:  Location   Type:      [x]IFC     [] Pre-mod                   [] Haitian   Position: []supine []prone                  [x]reclined  []with wedge                 []seated []sidelying     Location: []right     []left  [x]central                  []hip  []knee []ankle                   []shoulder                    []low back  [x]neck                                     []  w/ice    [x]  w/heat   Skin assessment post-treatment (if applicable):    [x]  intact    []  redness- no adverse reaction                 []redness - adverse reaction:           Therapeutic Procedures:  45  Total [facility] Totals Reminder: bill using total billable min of TIMED therapeutic procedures (example: do not include dry needle or estim unattended, both untimed codes, in totals to left)  8-22 min = 1 unit; 23-37 min = 2 units; 38-52 min = 3 units; 53-67 min = 4 units; 68-82 min = 5 units   Tx Min Procedure, Rationale, Specifics   32 13121 Therapeutic Exercise (timed):  increase ROM, strength, coordination, balance, and proprioception to improve patient's ability to progress to PLOF and address remaining functional goals. (see flow sheet as applicable)   Details if applicable:       99 02645 Manual Therapy (timed):  decrease  pain, increase ROM, increase tissue extensibility, decrease trigger points, and increase postural awareness to improve patient's ability to progress to PLOF and address remaining functional goals.  The manual therapy interventions were performed at a separate and distinct time from the therapeutic activities interventions . (see flow sheet as applicable)     Details if applicable:  In supine- STM and TPR to SCM, suboccipital release, cervical paraspinals. In prone- grade V manipulation to thoracic spine segments T2-10 with pt consent       [x]  Patient Education billed concurrently with other procedures   [x] Review HEP    [] Progressed/Changed HEP, detail:    [] Other detail:       Objective Information/Functional Measures/Assessment    -Instructed patient in how to self mobilize t spine with use of foam roll at home. Verbal cues specifically provided for placement of foam roll and protection of cervical spine with hands at base of neck.  -Cervical rotation to right improves from 48 deg to 53 deg following self mobilization with towel.    Patient will continue to benefit from skilled PT / OT services to modify and progress therapeutic interventions, analyze and address functional mobility deficits, analyze and address ROM deficits, analyze and address strength deficits, analyze and address soft tissue restrictions, analyze and cue for proper movement patterns, analyze and modify for postural abnormalities, analyze and address imbalance/dizziness, and instruct in home and community integration to address functional deficits and attain remaining goals.    Progress toward goals / Updated goals:  []  See Progress Note/Recertification    Pt will improve his NDI score to 10% to improve ability to perform ADLs.  Status at last note/certification: 14%  2.  Pt will increase AROM c/s right SB to WNL, left SB to 35 degs to improve ability to return to PLOF.  Status at last note/certification:  Cervical AROM AROM

## 2025-06-02 ENCOUNTER — HOSPITAL ENCOUNTER (OUTPATIENT)
Facility: HOSPITAL | Age: 55
Setting detail: RECURRING SERIES
Discharge: HOME | End: 2025-06-05
Payer: OTHER GOVERNMENT

## 2025-06-02 PROCEDURE — 97530 THERAPEUTIC ACTIVITIES: CPT

## 2025-06-02 PROCEDURE — 97110 THERAPEUTIC EXERCISES: CPT

## 2025-06-02 PROCEDURE — 97112 NEUROMUSCULAR REEDUCATION: CPT

## 2025-06-02 PROCEDURE — G0283 ELEC STIM OTHER THAN WOUND: HCPCS

## 2025-06-02 NOTE — PROGRESS NOTES
PHYSICAL / OCCUPATIONAL THERAPY - DAILY TREATMENT NOTE (updated )    Patient Name: Fadi Cleveland    Date: 2025    : 1970  Insurance: Payor:  EAST / Plan: [place] [employer] / Product Type: *No Product type* /      Patient  verified yes     Visit #   Current / Total 2 4 Total Time   Time   In / Out 7:40 AM  8:32 AM 52   Pain   In / Out 1-2/10 upper back  0/10    Subjective Functional Status/Changes: \"Neck is alright. Upper back is still achy.\"       TREATMENT AREA = Neck pain, chronic  Upper back pain  Neck pain    OBJECTIVE    Estim Unattended (UNTIMED), with HEAT type/location: thoracic      Min Rationale   10 decrease pain and increase tissue extensibility to improve patient's ability to progress to PLOF and address remaining functional goals.     Skin assessment post-treatment:   Intact      Therapeutic Procedures:    Total  42 [facility] Totals Reminder: bill using total billable min of TIMED therapeutic procedures (example: do not include dry needle or estim unattended, both untimed codes, in totals to left)  8-22 min = 1 unit; 23-37 min = 2 units; 38-52 min = 3 units; 53-67 min = 4 units; 68-82 min = 5 units   Tx Min Procedure, Rationale, Specifics   23 83402 Therapeutic Exercise (timed):  increase ROM, strength, coordination, balance, and proprioception to improve patient's ability to progress to PLOF and address remaining functional goals. (see flow sheet as applicable)     Details if applicable:  UBE, stretches, strengthening, mobility      9 15704 Therapeutic Activity (timed):  use of dynamic activities replicating functional movements to increase ROM, strength, coordination, balance, and proprioception in order to improve patient's ability to progress to PLOF and address remaining functional goals.  (see flow sheet as applicable)  \    Details if applicable: functional movements, pt edu    10 03116 Neuromuscular Re-Education (timed):  improve balance, coordination, kinesthetic

## 2025-06-13 ENCOUNTER — HOSPITAL ENCOUNTER (OUTPATIENT)
Facility: HOSPITAL | Age: 55
Setting detail: RECURRING SERIES
Discharge: HOME | End: 2025-06-16
Payer: OTHER GOVERNMENT

## 2025-06-13 PROCEDURE — 97140 MANUAL THERAPY 1/> REGIONS: CPT

## 2025-06-13 PROCEDURE — G0283 ELEC STIM OTHER THAN WOUND: HCPCS

## 2025-06-13 PROCEDURE — 97530 THERAPEUTIC ACTIVITIES: CPT

## 2025-06-13 PROCEDURE — 97112 NEUROMUSCULAR REEDUCATION: CPT

## 2025-06-13 PROCEDURE — 97110 THERAPEUTIC EXERCISES: CPT

## 2025-06-13 NOTE — PROGRESS NOTES
sheet as applicable)     Details if applicable: functional movements      12 69878 Manual Therapy (timed):  decrease pain, increase ROM, and increase tissue extensibility to improve patient's ability to progress to PLOF and address remaining functional goals.  The manual therapy interventions were performed at a separate and distinct time from the therapeutic activities interventions . (see flow sheet as applicable)     Details if applicable:    MFR to periscapulars in prone f/b grade IV PA mobs to T/S; STM to (L) biceps long head and middle deltoid     42 [facility] Totals Reminder: bill using total billable min of TIMED therapeutic procedures (example: do not include dry needle or estim unattended, both untimed codes, in totals to left)  8-22 min = 1 unit; 23-37 min = 2 units; 38-52 min = 3 units; 53-67 min = 4 units; 68-82 min = 5 units   Total     [x]  Patient Education billed concurrently with other procedures   [x] Review HEP    [] Progressed/Changed HEP, detail:    [] Other detail:         Objective Information/Functional Measures/Assessment  Patient with palpable hypomobility of the upper T/S improved with PA mobilizations. Results in improved scapular awareness and control during prone scapular PREs. Patient does endorse left lateral shoulder pain with abduction attempts (as during rainbow intervention), possibly indicative of RTC pathology. IFC modified to address patient's chief complaint.      Patient will continue to benefit from skilled PT / OT services to modify and progress therapeutic interventions, analyze and address functional mobility deficits, analyze and address ROM deficits, analyze and address strength deficits, analyze and address soft tissue restrictions, analyze and cue for proper movement patterns, analyze and modify for postural abnormalities, analyze and address imbalance, and instruct in home and community integration to address functional deficits and attain remaining goals.     Progress

## 2025-06-17 ENCOUNTER — HOSPITAL ENCOUNTER (OUTPATIENT)
Facility: HOSPITAL | Age: 55
Setting detail: RECURRING SERIES
Discharge: HOME | End: 2025-06-20
Payer: OTHER GOVERNMENT

## 2025-06-17 PROCEDURE — G0283 ELEC STIM OTHER THAN WOUND: HCPCS

## 2025-06-17 PROCEDURE — 97110 THERAPEUTIC EXERCISES: CPT

## 2025-06-17 PROCEDURE — 97112 NEUROMUSCULAR REEDUCATION: CPT

## 2025-06-17 PROCEDURE — 97530 THERAPEUTIC ACTIVITIES: CPT

## 2025-06-17 PROCEDURE — 97140 MANUAL THERAPY 1/> REGIONS: CPT

## 2025-06-17 NOTE — PROGRESS NOTES
in the left, felt vertebrae in R    SB left  45   rightness in the right shoulder   Rotation right 50    some tightness   Rotation left 55    some tightness      Thoracic Spine: [] N/A    [] WNL   [x] Other: Mild limitations, thoracic kyphosis and FHP    Primary mitigating factor which impeded progress:  None of these apply from preset list (refer to note for other details)      Pt will improve his NDI score to 10% to improve ability to perform ADLs.  Status at last note/certification: 14%  Current 6/17/25: 10%  2.  Pt will increase AROM c/s right SB to WNL, left SB to 35 degs to improve ability to return to PLOF.  Status at last note/certification:  Cervical AROM AROM Comments:pain, area   SB right 31 degs Tightness, slight increase in pain   SB left  22 degs Tightness, slight increase in pain      Current 6/17/25 MET  Cervical AROM AROM Comments:pain, area   SB right 40 degs Tightness   SB left  45 degs Tightness    3.  Pt will increase AROM c/s right rotation to 60 degs, left rotation to 70 degs to improve ability to move his head with more ease when driving.  Status at last note/certification:  Cervical AROM AROM Comments:pain, area   Rotation right 46 degs Tightness, slight increase in pain   Rotation left 56 degs Tightness, slight increase in pain      Current 6/17/25:  Cervical AROM AROM Comments:pain, area   Rotation right 50 degs Tightness   Rotation left 55 degs Tightness      4.  Pt will report no more than 2/10 pain during the work day to improve ability to tolerate work activities.   Status at last note/certification: goal progressing, 3/10 in the past 5 days (6/02/2025)   Current 6/17/25: 1-2/10 over the past week    CONTINUE ALL PREVIOUS GOALS FROM ABOVE    Frequency / Duration:   Patient to be seen   1   times per week for   4    WEEKS    RECOMMENDATIONS  Patient would benefit from the continuation of skilled rehab interventions for functional progress to achieving above stated clinically significant 
Movements:   ROM AROM   Comments:pain, area   Forward flexion 65       Extension 60       SB right 40   Tightness in the left, felt vertebrae in R    SB left  45   rightness in the right shoulder   Rotation right 50    some tightness   Rotation left 55    some tightness      Thoracic Spine: [] N/A    [] WNL   [x] Other: Mild limitations, thoracic kyphosis and FHP      Patient will continue to benefit from skilled PT / OT services to modify and progress therapeutic interventions, analyze and address functional mobility deficits, analyze and address ROM deficits, analyze and address strength deficits, analyze and address soft tissue restrictions, analyze and cue for proper movement patterns, analyze and modify for postural abnormalities, analyze and address imbalance, and instruct in home and community integration to address functional deficits and attain remaining goals.     Progress toward goals / Updated goals:  []  See Progress Note/Recertification     Pt will improve his NDI score to 10% to improve ability to perform ADLs.  Status at last note/certification: 14%  Current 6/17/25: 10%  2.  Pt will increase AROM c/s right SB to WNL, left SB to 35 degs to improve ability to return to PLOF.  Status at last note/certification:  Cervical AROM AROM Comments:pain, area   SB right 31 degs Tightness, slight increase in pain   SB left  22 degs Tightness, slight increase in pain      Current 6/17/25 MET  Cervical AROM AROM Comments:pain, area   SB right 40 degs Tightness   SB left  45 degs Tightness    3.  Pt will increase AROM c/s right rotation to 60 degs, left rotation to 70 degs to improve ability to move his head with more ease when driving.  Status at last note/certification:  Cervical AROM AROM Comments:pain, area   Rotation right 46 degs Tightness, slight increase in pain   Rotation left 56 degs Tightness, slight increase in pain      Current 6/17/25:  Cervical AROM AROM Comments:pain, area   Rotation right 50 degs

## 2025-06-24 ENCOUNTER — HOSPITAL ENCOUNTER (OUTPATIENT)
Facility: HOSPITAL | Age: 55
Setting detail: RECURRING SERIES
Discharge: HOME | End: 2025-06-27
Payer: OTHER GOVERNMENT

## 2025-06-24 PROCEDURE — 97110 THERAPEUTIC EXERCISES: CPT

## 2025-06-24 PROCEDURE — 97530 THERAPEUTIC ACTIVITIES: CPT

## 2025-06-24 PROCEDURE — 97112 NEUROMUSCULAR REEDUCATION: CPT

## 2025-06-24 NOTE — PROGRESS NOTES
PHYSICAL / OCCUPATIONAL THERAPY - DAILY TREATMENT NOTE    Patient Name: Fadi Cleveland    Date: 2025    : 1970  Insurance: Payor:  EAST / Plan:  [facility] / Product Type: *No Product type* /      Patient  verified Yes     Visit #   Current / Total 1 10   Time   In / Out 7:00 am 8:05 am   Pain   In / Out 0/10 in back, 7/10 with shoulder 0/10 in mid-back, improved in shoulder- reports dull ache   Subjective Functional Status/Changes: Pt reports semi-new onset of left shoulder pain. Report he's had it for a few weeks, but it has increased to the point he cannot lift his arm without pain     TREATMENT AREA =  Neck pain, chronic  Upper back pain  Neck pain     OBJECTIVE          Ice (UNBILLED):  location/position: L shoulder     Min Rationale   10 decrease pain to improve patient's ability to progress to PLOF and address remaining functional goals.     Skin assessment post-treatment:   Intact        Therapeutic Procedures:  Tx Min Procedure, Rationale, Specifics   15 04390 Therapeutic Exercise (timed):  increase ROM, strength, coordination, balance, and proprioception to improve patient's ability to progress to PLOF and address remaining functional goals. (see flow sheet as applicable)     Details if applicable:  UBE, stretches, strengthening, mobility      15 29287 Neuromuscular Re-Education (timed):  improve balance, coordination, kinesthetic sense, posture, core stability and proprioception to improve patient's ability to develop conscious control of individual muscles and awareness of position of extremities in order to progress to PLOF and address remaining functional goals. (see flow sheet as applicable)     Details if applicable:  scapular re-education     25 19830 Therapeutic Activity (timed):  use of dynamic activities replicating functional movements to increase ROM, strength, coordination, balance, and proprioception in order to improve patient's ability to progress to PLOF

## 2025-07-01 ENCOUNTER — HOSPITAL ENCOUNTER (OUTPATIENT)
Facility: HOSPITAL | Age: 55
Setting detail: RECURRING SERIES
Discharge: HOME | End: 2025-07-04
Payer: OTHER GOVERNMENT

## 2025-07-01 PROCEDURE — 97535 SELF CARE MNGMENT TRAINING: CPT

## 2025-07-01 PROCEDURE — 97112 NEUROMUSCULAR REEDUCATION: CPT

## 2025-07-01 PROCEDURE — 97530 THERAPEUTIC ACTIVITIES: CPT

## 2025-07-01 PROCEDURE — 97110 THERAPEUTIC EXERCISES: CPT

## 2025-07-01 NOTE — PROGRESS NOTES
PHYSICAL / OCCUPATIONAL THERAPY - DAILY TREATMENT NOTE    Patient Name: Fadi Cleveland    Date: 2025    : 1970  Insurance: Payor:  EAST / Plan: [employer] [facility] / Product Type: *No Product type* /      Patient  verified Yes     Visit #   Current / Total 2 10   Time   In / Out 7:00 am 7:50 am   Pain   In / Out 1-2 1   Subjective Functional Status/Changes: Pt reports his shoulder pain is getting better     TREATMENT AREA =  Neck pain, chronic  Upper back pain  Neck pain     OBJECTIVE          Ice (UNBILLED):  location/position: L shoulder     Min Rationale   10 decrease pain to improve patient's ability to progress to PLOF and address remaining functional goals.     Skin assessment post-treatment:   Intact        Therapeutic Procedures:  Tx Min Procedure, Rationale, Specifics   9 83519 Therapeutic Exercise (timed):  increase ROM, strength, coordination, balance, and proprioception to improve patient's ability to progress to PLOF and address remaining functional goals. (see flow sheet as applicable)     Details if applicable:  UBE, stretches, strengthening, mobility      15 58479 Neuromuscular Re-Education (timed):  improve balance, coordination, kinesthetic sense, posture, core stability and proprioception to improve patient's ability to develop conscious control of individual muscles and awareness of position of extremities in order to progress to PLOF and address remaining functional goals. (see flow sheet as applicable)     Details if applicable:  scapular re-education, shoulder rotator cuff re-ed     8 09752 Therapeutic Activity (timed):  use of dynamic activities replicating functional movements to increase ROM, strength, coordination, balance, and proprioception in order to improve patient's ability to progress to PLOF and address remaining functional goals.  (see flow sheet as applicable)     Details if applicable: functional movements     8 21452 Self Care/Home Management (timed):

## 2025-07-09 ENCOUNTER — APPOINTMENT (OUTPATIENT)
Facility: HOSPITAL | Age: 55
End: 2025-07-09
Payer: OTHER GOVERNMENT

## 2025-07-10 ENCOUNTER — HOSPITAL ENCOUNTER (OUTPATIENT)
Facility: HOSPITAL | Age: 55
Setting detail: RECURRING SERIES
Discharge: HOME | End: 2025-07-13
Payer: OTHER GOVERNMENT

## 2025-07-10 PROCEDURE — 97110 THERAPEUTIC EXERCISES: CPT

## 2025-07-10 PROCEDURE — 97530 THERAPEUTIC ACTIVITIES: CPT

## 2025-07-10 PROCEDURE — 97140 MANUAL THERAPY 1/> REGIONS: CPT

## 2025-07-10 PROCEDURE — 97535 SELF CARE MNGMENT TRAINING: CPT

## 2025-07-10 PROCEDURE — 97112 NEUROMUSCULAR REEDUCATION: CPT

## 2025-07-10 NOTE — PROGRESS NOTES
[facility] [place] [facility] [place]   [address] [place] [address]  Phone: [phone] Fax: [phone]  DISCHARGE NOTE  Patient Name: Fadi Cleveland : 1970   Treatment/Medical Diagnosis: Neck pain, chronic  Upper back pain  Neck pain   Referral Source:  Payor Roldan Louis,*  Payor:  EAST / Plan:  [facility] / Product Type: *No Product type* /      Date of Initial Visit: 2025 Attended Visits: 9 Missed Visits: 0     SUMMARY OF TREATMENT  Pt has been seen 12 times during this plan of care, therapy has been focusing on postural re-education, strengthening, mobility, and decreasing pain. Pt has made functional gains in his ability to perform work duties without much increase in pain.  Pt demos and reports increased understanding of condition and management strategies.     CURRENT STATUS    Pt has made improvements in postural awareness, decreased pain levels, and increase ROM. Pt reports continued deficits primarily being his posture throughout the day may be affecting his pain / tightness.   On the NDI, he scored 10%, with primary deficits reported being very mild pain with functional activities.   Pt initiated conversation re d/c and we discussed discharge planning, where his last appointment will be  and we will begin the process of finalizing his HEP for independent management of his condition.      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

## 2025-07-10 NOTE — PROGRESS NOTES
Physical Therapy Discharge Instructions      [facility] [place]   [address] [place] [address]  Phone: [phone] Fax [phone]      Patient: Fadi Cleveland  : 1970      Continue Home Exercise Program 1 times per day for 4 weeks, then decrease to 5 times per week      Continue with    [x] Ice  as needed 1 times per day     [] Heat           Follow up with MD:     [] Upon completion of therapy     [x] As needed if your L shoulder continues to be an issue      Recommendations:     [x]   Return to activity with home program    []   Return to activity with the following modifications:       []Post Rehab Program    []Join Independent aquatic program     []Return to/join local gym        Additional Comments: Thank you!           Patrica Andrews, PT 7/10/2025 3:40 PM

## 2025-07-10 NOTE — PROGRESS NOTES
PHYSICAL / OCCUPATIONAL THERAPY - DAILY TREATMENT NOTE    Patient Name: Fadi Cleveland    Date: 7/10/2025    : 1970  Insurance: Payor:  EAST / Plan: [employer] [facility]AST SELECT / Product Type: *No Product type* /      Patient  verified Yes     Visit #   Current / Total 3 10   Time   In / Out 3:26 4:26   Pain   In / Out 3 0   Subjective Functional Status/Changes: Midback is more than usual today since I helped move items      TREATMENT AREA =  Neck pain, chronic  Upper back pain  Neck pain     OBJECTIVE  Ice (UNBILLED):  location/position: L shoulder     Min Rationale   10 decrease pain to improve patient's ability to progress to PLOF and address remaining functional goals.     Skin assessment post-treatment:   Intact        Therapeutic Procedures:  Tx Min Procedure, Rationale, Specifics   13 18540 Therapeutic Exercise (timed):  increase ROM, strength, coordination, balance, and proprioception to improve patient's ability to progress to PLOF and address remaining functional goals. (see flow sheet as applicable)     Details if applicable:  UBE, stretches, strengthening, mobility      10 18098 Neuromuscular Re-Education (timed):  improve balance, coordination, kinesthetic sense, posture, core stability and proprioception to improve patient's ability to develop conscious control of individual muscles and awareness of position of extremities in order to progress to PLOF and address remaining functional goals. (see flow sheet as applicable)     Details if applicable:  scapular re-education, shoulder rotator cuff re-ed     8 94408 Therapeutic Activity (timed):  use of dynamic activities replicating functional movements to increase ROM, strength, coordination, balance, and proprioception in order to improve patient's ability to progress to PLOF and address remaining functional goals.  (see flow sheet as applicable)     Details if applicable: functional movements  Added CS retraction extension and rotation to

## 2025-07-14 ENCOUNTER — APPOINTMENT (OUTPATIENT)
Facility: HOSPITAL | Age: 55
End: 2025-07-14
Payer: OTHER GOVERNMENT